# Patient Record
Sex: MALE | Race: WHITE | NOT HISPANIC OR LATINO | Employment: OTHER | URBAN - METROPOLITAN AREA
[De-identification: names, ages, dates, MRNs, and addresses within clinical notes are randomized per-mention and may not be internally consistent; named-entity substitution may affect disease eponyms.]

---

## 2021-01-15 DIAGNOSIS — Z23 NEED FOR VACCINATION: ICD-10-CM

## 2022-01-26 ENCOUNTER — APPOINTMENT (OUTPATIENT)
Dept: RADIOLOGY | Facility: MEDICAL CENTER | Age: 72
DRG: 521 | End: 2022-01-26
Attending: EMERGENCY MEDICINE
Payer: MEDICARE

## 2022-01-26 ENCOUNTER — HOSPITAL ENCOUNTER (INPATIENT)
Facility: MEDICAL CENTER | Age: 72
LOS: 5 days | DRG: 521 | End: 2022-01-31
Attending: EMERGENCY MEDICINE | Admitting: INTERNAL MEDICINE
Payer: MEDICARE

## 2022-01-26 DIAGNOSIS — I26.99 OTHER ACUTE PULMONARY EMBOLISM WITHOUT ACUTE COR PULMONALE (HCC): ICD-10-CM

## 2022-01-26 DIAGNOSIS — Z87.81 S/P LEFT HIP FRACTURE: ICD-10-CM

## 2022-01-26 PROBLEM — R56.9 SEIZURE (HCC): Status: ACTIVE | Noted: 2022-01-26

## 2022-01-26 PROBLEM — R73.9 HYPERGLYCEMIA: Status: ACTIVE | Noted: 2022-01-26

## 2022-01-26 PROBLEM — F03.90 DEMENTIA (HCC): Status: ACTIVE | Noted: 2022-01-26

## 2022-01-26 PROBLEM — D64.9 ANEMIA: Status: ACTIVE | Noted: 2022-01-26

## 2022-01-26 LAB
ALBUMIN SERPL BCP-MCNC: 3.7 G/DL (ref 3.2–4.9)
ALBUMIN/GLOB SERPL: 1.2 G/DL
ALP SERPL-CCNC: 85 U/L (ref 30–99)
ALT SERPL-CCNC: 23 U/L (ref 2–50)
ANION GAP SERPL CALC-SCNC: 13 MMOL/L (ref 7–16)
APTT PPP: 31.8 SEC (ref 24.7–36)
AST SERPL-CCNC: 23 U/L (ref 12–45)
BASOPHILS # BLD AUTO: 0.3 % (ref 0–1.8)
BASOPHILS # BLD: 0.03 K/UL (ref 0–0.12)
BILIRUB SERPL-MCNC: 0.3 MG/DL (ref 0.1–1.5)
BUN SERPL-MCNC: 25 MG/DL (ref 8–22)
CALCIUM SERPL-MCNC: 8.3 MG/DL (ref 8.5–10.5)
CHLORIDE SERPL-SCNC: 99 MMOL/L (ref 96–112)
CK SERPL-CCNC: 145 U/L (ref 0–154)
CO2 SERPL-SCNC: 20 MMOL/L (ref 20–33)
CREAT SERPL-MCNC: 1.36 MG/DL (ref 0.5–1.4)
EOSINOPHIL # BLD AUTO: 0.03 K/UL (ref 0–0.51)
EOSINOPHIL NFR BLD: 0.3 % (ref 0–6.9)
ERYTHROCYTE [DISTWIDTH] IN BLOOD BY AUTOMATED COUNT: 51.1 FL (ref 35.9–50)
EST. AVERAGE GLUCOSE BLD GHB EST-MCNC: 123 MG/DL
FERRITIN SERPL-MCNC: 222 NG/ML (ref 22–322)
FOLATE SERPL-MCNC: 7.8 NG/ML
GLOBULIN SER CALC-MCNC: 3 G/DL (ref 1.9–3.5)
GLUCOSE SERPL-MCNC: 241 MG/DL (ref 65–99)
HBA1C MFR BLD: 5.9 % (ref 4–5.6)
HCT VFR BLD AUTO: 28.8 % (ref 42–52)
HGB BLD-MCNC: 9.5 G/DL (ref 14–18)
HGB RETIC QN AUTO: 34.1 PG/CELL (ref 29–35)
IMM GRANULOCYTES # BLD AUTO: 0.06 K/UL (ref 0–0.11)
IMM GRANULOCYTES NFR BLD AUTO: 0.6 % (ref 0–0.9)
IMM RETICS NFR: 6.7 % (ref 9.3–17.4)
INR PPP: 1.13 (ref 0.87–1.13)
IRON SATN MFR SERPL: 21 % (ref 15–55)
IRON SERPL-MCNC: 52 UG/DL (ref 50–180)
LYMPHOCYTES # BLD AUTO: 0.56 K/UL (ref 1–4.8)
LYMPHOCYTES NFR BLD: 5.2 % (ref 22–41)
MAGNESIUM SERPL-MCNC: 2 MG/DL (ref 1.5–2.5)
MCH RBC QN AUTO: 31.6 PG (ref 27–33)
MCHC RBC AUTO-ENTMCNC: 33 G/DL (ref 33.7–35.3)
MCV RBC AUTO: 95.7 FL (ref 81.4–97.8)
MONOCYTES # BLD AUTO: 0.51 K/UL (ref 0–0.85)
MONOCYTES NFR BLD AUTO: 4.8 % (ref 0–13.4)
NEUTROPHILS # BLD AUTO: 9.53 K/UL (ref 1.82–7.42)
NEUTROPHILS NFR BLD: 88.8 % (ref 44–72)
NRBC # BLD AUTO: 0 K/UL
NRBC BLD-RTO: 0 /100 WBC
PHENYTOIN SERPL-MCNC: <0.8 UG/ML (ref 10–20)
PLATELET # BLD AUTO: 243 K/UL (ref 164–446)
PMV BLD AUTO: 9.9 FL (ref 9–12.9)
POTASSIUM SERPL-SCNC: 3.6 MMOL/L (ref 3.6–5.5)
PROT SERPL-MCNC: 6.7 G/DL (ref 6–8.2)
PROTHROMBIN TIME: 14.2 SEC (ref 12–14.6)
RBC # BLD AUTO: 3.01 M/UL (ref 4.7–6.1)
RETICS # AUTO: 0.05 M/UL (ref 0.04–0.06)
RETICS/RBC NFR: 1.5 % (ref 0.8–2.1)
SODIUM SERPL-SCNC: 132 MMOL/L (ref 135–145)
TIBC SERPL-MCNC: 243 UG/DL (ref 250–450)
UIBC SERPL-MCNC: 191 UG/DL (ref 110–370)
VIT B12 SERPL-MCNC: 625 PG/ML (ref 211–911)
WBC # BLD AUTO: 10.7 K/UL (ref 4.8–10.8)

## 2022-01-26 PROCEDURE — 80185 ASSAY OF PHENYTOIN TOTAL: CPT

## 2022-01-26 PROCEDURE — 72192 CT PELVIS W/O DYE: CPT

## 2022-01-26 PROCEDURE — 96375 TX/PRO/DX INJ NEW DRUG ADDON: CPT

## 2022-01-26 PROCEDURE — 700111 HCHG RX REV CODE 636 W/ 250 OVERRIDE (IP): Performed by: EMERGENCY MEDICINE

## 2022-01-26 PROCEDURE — 83036 HEMOGLOBIN GLYCOSYLATED A1C: CPT

## 2022-01-26 PROCEDURE — 85730 THROMBOPLASTIN TIME PARTIAL: CPT

## 2022-01-26 PROCEDURE — 82550 ASSAY OF CK (CPK): CPT

## 2022-01-26 PROCEDURE — 82607 VITAMIN B-12: CPT

## 2022-01-26 PROCEDURE — 85025 COMPLETE CBC W/AUTO DIFF WBC: CPT

## 2022-01-26 PROCEDURE — 80053 COMPREHEN METABOLIC PANEL: CPT

## 2022-01-26 PROCEDURE — 700102 HCHG RX REV CODE 250 W/ 637 OVERRIDE(OP): Performed by: INTERNAL MEDICINE

## 2022-01-26 PROCEDURE — 96374 THER/PROPH/DIAG INJ IV PUSH: CPT

## 2022-01-26 PROCEDURE — 700105 HCHG RX REV CODE 258: Performed by: INTERNAL MEDICINE

## 2022-01-26 PROCEDURE — 83735 ASSAY OF MAGNESIUM: CPT

## 2022-01-26 PROCEDURE — 99223 1ST HOSP IP/OBS HIGH 75: CPT | Mod: AI | Performed by: INTERNAL MEDICINE

## 2022-01-26 PROCEDURE — 82962 GLUCOSE BLOOD TEST: CPT

## 2022-01-26 PROCEDURE — 99291 CRITICAL CARE FIRST HOUR: CPT

## 2022-01-26 PROCEDURE — 96372 THER/PROPH/DIAG INJ SC/IM: CPT

## 2022-01-26 PROCEDURE — 83540 ASSAY OF IRON: CPT

## 2022-01-26 PROCEDURE — 70450 CT HEAD/BRAIN W/O DYE: CPT

## 2022-01-26 PROCEDURE — 85046 RETICYTE/HGB CONCENTRATE: CPT

## 2022-01-26 PROCEDURE — 770001 HCHG ROOM/CARE - MED/SURG/GYN PRIV*

## 2022-01-26 PROCEDURE — 82746 ASSAY OF FOLIC ACID SERUM: CPT

## 2022-01-26 PROCEDURE — 82728 ASSAY OF FERRITIN: CPT

## 2022-01-26 PROCEDURE — 83550 IRON BINDING TEST: CPT

## 2022-01-26 PROCEDURE — A9270 NON-COVERED ITEM OR SERVICE: HCPCS | Performed by: INTERNAL MEDICINE

## 2022-01-26 PROCEDURE — 99222 1ST HOSP IP/OBS MODERATE 55: CPT | Mod: 57 | Performed by: ORTHOPAEDIC SURGERY

## 2022-01-26 PROCEDURE — 700111 HCHG RX REV CODE 636 W/ 250 OVERRIDE (IP): Performed by: INTERNAL MEDICINE

## 2022-01-26 PROCEDURE — 85610 PROTHROMBIN TIME: CPT

## 2022-01-26 RX ORDER — DEXTROSE MONOHYDRATE 25 G/50ML
50 INJECTION, SOLUTION INTRAVENOUS
Status: DISCONTINUED | OUTPATIENT
Start: 2022-01-26 | End: 2022-01-27

## 2022-01-26 RX ORDER — ONDANSETRON 4 MG/1
4 TABLET, ORALLY DISINTEGRATING ORAL EVERY 4 HOURS PRN
Status: DISCONTINUED | OUTPATIENT
Start: 2022-01-26 | End: 2022-01-31 | Stop reason: HOSPADM

## 2022-01-26 RX ORDER — HYDROMORPHONE HYDROCHLORIDE 1 MG/ML
0.25 INJECTION, SOLUTION INTRAMUSCULAR; INTRAVENOUS; SUBCUTANEOUS
Status: DISCONTINUED | OUTPATIENT
Start: 2022-01-26 | End: 2022-01-28

## 2022-01-26 RX ORDER — HEPARIN SODIUM 5000 [USP'U]/ML
5000 INJECTION, SOLUTION INTRAVENOUS; SUBCUTANEOUS EVERY 8 HOURS
Status: DISCONTINUED | OUTPATIENT
Start: 2022-01-26 | End: 2022-01-28

## 2022-01-26 RX ORDER — PHENYTOIN SODIUM 100 MG/1
200 CAPSULE, EXTENDED RELEASE ORAL
COMMUNITY

## 2022-01-26 RX ORDER — LABETALOL HYDROCHLORIDE 5 MG/ML
10 INJECTION, SOLUTION INTRAVENOUS EVERY 4 HOURS PRN
Status: DISCONTINUED | OUTPATIENT
Start: 2022-01-26 | End: 2022-01-31 | Stop reason: HOSPADM

## 2022-01-26 RX ORDER — ONDANSETRON 2 MG/ML
4 INJECTION INTRAMUSCULAR; INTRAVENOUS EVERY 4 HOURS PRN
Status: DISCONTINUED | OUTPATIENT
Start: 2022-01-26 | End: 2022-01-28

## 2022-01-26 RX ORDER — OXYCODONE HYDROCHLORIDE 5 MG/1
5 TABLET ORAL
Status: DISCONTINUED | OUTPATIENT
Start: 2022-01-26 | End: 2022-01-28

## 2022-01-26 RX ORDER — PHENYTOIN SODIUM 100 MG/1
200 CAPSULE, EXTENDED RELEASE ORAL EVERY EVENING
Status: DISCONTINUED | OUTPATIENT
Start: 2022-01-26 | End: 2022-01-31 | Stop reason: HOSPADM

## 2022-01-26 RX ORDER — ACETAMINOPHEN 325 MG/1
650 TABLET ORAL EVERY 6 HOURS PRN
Status: DISCONTINUED | OUTPATIENT
Start: 2022-01-26 | End: 2022-01-28

## 2022-01-26 RX ORDER — LISINOPRIL 5 MG/1
5 TABLET ORAL EVERY MORNING
Status: ON HOLD | COMMUNITY
End: 2022-01-31

## 2022-01-26 RX ORDER — SIMVASTATIN 5 MG
5 TABLET ORAL NIGHTLY
COMMUNITY

## 2022-01-26 RX ORDER — SODIUM CHLORIDE 9 MG/ML
INJECTION, SOLUTION INTRAVENOUS CONTINUOUS
Status: ACTIVE | OUTPATIENT
Start: 2022-01-26 | End: 2022-01-27

## 2022-01-26 RX ORDER — BISACODYL 10 MG
10 SUPPOSITORY, RECTAL RECTAL
Status: DISCONTINUED | OUTPATIENT
Start: 2022-01-26 | End: 2022-01-28

## 2022-01-26 RX ORDER — OXYCODONE HYDROCHLORIDE 5 MG/1
2.5 TABLET ORAL
Status: DISCONTINUED | OUTPATIENT
Start: 2022-01-26 | End: 2022-01-28

## 2022-01-26 RX ORDER — AMOXICILLIN 250 MG
2 CAPSULE ORAL 2 TIMES DAILY
Status: DISCONTINUED | OUTPATIENT
Start: 2022-01-26 | End: 2022-01-28

## 2022-01-26 RX ORDER — POLYETHYLENE GLYCOL 3350 17 G/17G
1 POWDER, FOR SOLUTION ORAL
Status: DISCONTINUED | OUTPATIENT
Start: 2022-01-26 | End: 2022-01-28

## 2022-01-26 RX ADMIN — FENTANYL CITRATE 50 MCG: 50 INJECTION, SOLUTION INTRAMUSCULAR; INTRAVENOUS at 19:43

## 2022-01-26 RX ADMIN — SODIUM CHLORIDE: 9 INJECTION, SOLUTION INTRAVENOUS at 21:46

## 2022-01-26 RX ADMIN — PHENYTOIN SODIUM 200 MG: 100 CAPSULE ORAL at 21:46

## 2022-01-26 RX ADMIN — HEPARIN SODIUM 5000 UNITS: 5000 INJECTION, SOLUTION INTRAVENOUS; SUBCUTANEOUS at 21:48

## 2022-01-26 RX ADMIN — HYDROMORPHONE HYDROCHLORIDE 0.25 MG: 1 INJECTION, SOLUTION INTRAMUSCULAR; INTRAVENOUS; SUBCUTANEOUS at 20:51

## 2022-01-26 ASSESSMENT — ENCOUNTER SYMPTOMS
BRUISES/BLEEDS EASILY: 0
BLURRED VISION: 0
INSOMNIA: 0
PALPITATIONS: 0
NECK PAIN: 0
MYALGIAS: 0
VOMITING: 0
SORE THROAT: 0
HEADACHES: 0
HEMOPTYSIS: 0
DOUBLE VISION: 0
DEPRESSION: 0
NAUSEA: 0
WEIGHT LOSS: 0
COUGH: 0
DIZZINESS: 0
FEVER: 0
STRIDOR: 0

## 2022-01-27 ENCOUNTER — ANESTHESIA (OUTPATIENT)
Dept: SURGERY | Facility: MEDICAL CENTER | Age: 72
DRG: 521 | End: 2022-01-27
Payer: MEDICARE

## 2022-01-27 ENCOUNTER — ANESTHESIA EVENT (OUTPATIENT)
Dept: SURGERY | Facility: MEDICAL CENTER | Age: 72
DRG: 521 | End: 2022-01-27
Payer: MEDICARE

## 2022-01-27 LAB
ABO GROUP BLD: NORMAL
ANION GAP SERPL CALC-SCNC: 10 MMOL/L (ref 7–16)
BLD GP AB SCN SERPL QL: NORMAL
BUN SERPL-MCNC: 26 MG/DL (ref 8–22)
CALCIUM SERPL-MCNC: 8.5 MG/DL (ref 8.5–10.5)
CHLORIDE SERPL-SCNC: 103 MMOL/L (ref 96–112)
CO2 SERPL-SCNC: 20 MMOL/L (ref 20–33)
CREAT SERPL-MCNC: 1.18 MG/DL (ref 0.5–1.4)
EKG IMPRESSION: NORMAL
GLUCOSE BLD-MCNC: 105 MG/DL (ref 65–99)
GLUCOSE BLD-MCNC: 112 MG/DL (ref 65–99)
GLUCOSE BLD-MCNC: 244 MG/DL (ref 65–99)
GLUCOSE SERPL-MCNC: 119 MG/DL (ref 65–99)
POTASSIUM SERPL-SCNC: 4.1 MMOL/L (ref 3.6–5.5)
RH BLD: NORMAL
SARS-COV+SARS-COV-2 AG RESP QL IA.RAPID: NOTDETECTED
SODIUM SERPL-SCNC: 133 MMOL/L (ref 135–145)
SPECIMEN SOURCE: NORMAL

## 2022-01-27 PROCEDURE — 80048 BASIC METABOLIC PNL TOTAL CA: CPT

## 2022-01-27 PROCEDURE — 27236 TREAT THIGH FRACTURE: CPT | Mod: LT | Performed by: ORTHOPAEDIC SURGERY

## 2022-01-27 PROCEDURE — 770001 HCHG ROOM/CARE - MED/SURG/GYN PRIV*

## 2022-01-27 PROCEDURE — 160048 HCHG OR STATISTICAL LEVEL 1-5: Performed by: ORTHOPAEDIC SURGERY

## 2022-01-27 PROCEDURE — 0QS70ZZ REPOSITION LEFT UPPER FEMUR, OPEN APPROACH: ICD-10-PCS | Performed by: ORTHOPAEDIC SURGERY

## 2022-01-27 PROCEDURE — 27236 TREAT THIGH FRACTURE: CPT | Mod: 80ROC,LT | Performed by: STUDENT IN AN ORGANIZED HEALTH CARE EDUCATION/TRAINING PROGRAM

## 2022-01-27 PROCEDURE — 160042 HCHG SURGERY MINUTES - EA ADDL 1 MIN LEVEL 5: Performed by: ORTHOPAEDIC SURGERY

## 2022-01-27 PROCEDURE — 86900 BLOOD TYPING SEROLOGIC ABO: CPT

## 2022-01-27 PROCEDURE — 700101 HCHG RX REV CODE 250: Performed by: ANESTHESIOLOGY

## 2022-01-27 PROCEDURE — 36415 COLL VENOUS BLD VENIPUNCTURE: CPT

## 2022-01-27 PROCEDURE — L8699 PROSTHETIC IMPLANT NOS: HCPCS | Performed by: ORTHOPAEDIC SURGERY

## 2022-01-27 PROCEDURE — 99232 SBSQ HOSP IP/OBS MODERATE 35: CPT | Performed by: INTERNAL MEDICINE

## 2022-01-27 PROCEDURE — 93005 ELECTROCARDIOGRAM TRACING: CPT | Performed by: ORTHOPAEDIC SURGERY

## 2022-01-27 PROCEDURE — C1776 JOINT DEVICE (IMPLANTABLE): HCPCS | Performed by: ORTHOPAEDIC SURGERY

## 2022-01-27 PROCEDURE — 160002 HCHG RECOVERY MINUTES (STAT): Performed by: ORTHOPAEDIC SURGERY

## 2022-01-27 PROCEDURE — 93010 ELECTROCARDIOGRAM REPORT: CPT | Performed by: INTERNAL MEDICINE

## 2022-01-27 PROCEDURE — 700102 HCHG RX REV CODE 250 W/ 637 OVERRIDE(OP): Performed by: INTERNAL MEDICINE

## 2022-01-27 PROCEDURE — 0SRS0J9 REPLACEMENT OF LEFT HIP JOINT, FEMORAL SURFACE WITH SYNTHETIC SUBSTITUTE, CEMENTED, OPEN APPROACH: ICD-10-PCS | Performed by: ORTHOPAEDIC SURGERY

## 2022-01-27 PROCEDURE — A9270 NON-COVERED ITEM OR SERVICE: HCPCS | Performed by: INTERNAL MEDICINE

## 2022-01-27 PROCEDURE — 87426 SARSCOV CORONAVIRUS AG IA: CPT

## 2022-01-27 PROCEDURE — 96376 TX/PRO/DX INJ SAME DRUG ADON: CPT

## 2022-01-27 PROCEDURE — 700111 HCHG RX REV CODE 636 W/ 250 OVERRIDE (IP): Performed by: INTERNAL MEDICINE

## 2022-01-27 PROCEDURE — 86901 BLOOD TYPING SEROLOGIC RH(D): CPT

## 2022-01-27 PROCEDURE — 700105 HCHG RX REV CODE 258: Performed by: ANESTHESIOLOGY

## 2022-01-27 PROCEDURE — 502000 HCHG MISC OR IMPLANTS RC 0278: Performed by: ORTHOPAEDIC SURGERY

## 2022-01-27 PROCEDURE — 160031 HCHG SURGERY MINUTES - 1ST 30 MINS LEVEL 5: Performed by: ORTHOPAEDIC SURGERY

## 2022-01-27 PROCEDURE — 160009 HCHG ANES TIME/MIN: Performed by: ORTHOPAEDIC SURGERY

## 2022-01-27 PROCEDURE — 160035 HCHG PACU - 1ST 60 MINS PHASE I: Performed by: ORTHOPAEDIC SURGERY

## 2022-01-27 PROCEDURE — 86850 RBC ANTIBODY SCREEN: CPT

## 2022-01-27 PROCEDURE — 501838 HCHG SUTURE GENERAL: Performed by: ORTHOPAEDIC SURGERY

## 2022-01-27 PROCEDURE — 82962 GLUCOSE BLOOD TEST: CPT

## 2022-01-27 PROCEDURE — 700111 HCHG RX REV CODE 636 W/ 250 OVERRIDE (IP): Performed by: ANESTHESIOLOGY

## 2022-01-27 DEVICE — BONE CEMENT SIMPLEX ANTIBIO - (10/PK): Type: IMPLANTABLE DEVICE | Site: HIP | Status: FUNCTIONAL

## 2022-01-27 DEVICE — IMPLANTABLE DEVICE: Type: IMPLANTABLE DEVICE | Site: HIP | Status: FUNCTIONAL

## 2022-01-27 RX ORDER — SODIUM CHLORIDE, SODIUM LACTATE, POTASSIUM CHLORIDE, CALCIUM CHLORIDE 600; 310; 30; 20 MG/100ML; MG/100ML; MG/100ML; MG/100ML
INJECTION, SOLUTION INTRAVENOUS
Status: DISCONTINUED | OUTPATIENT
Start: 2022-01-27 | End: 2022-01-27 | Stop reason: SURG

## 2022-01-27 RX ORDER — ROCURONIUM BROMIDE 10 MG/ML
INJECTION, SOLUTION INTRAVENOUS PRN
Status: DISCONTINUED | OUTPATIENT
Start: 2022-01-27 | End: 2022-01-27 | Stop reason: SURG

## 2022-01-27 RX ORDER — DEXTROSE MONOHYDRATE 25 G/50ML
50 INJECTION, SOLUTION INTRAVENOUS
Status: DISCONTINUED | OUTPATIENT
Start: 2022-01-27 | End: 2022-01-31

## 2022-01-27 RX ORDER — CEFAZOLIN SODIUM 1 G/3ML
INJECTION, POWDER, FOR SOLUTION INTRAMUSCULAR; INTRAVENOUS PRN
Status: DISCONTINUED | OUTPATIENT
Start: 2022-01-27 | End: 2022-01-27 | Stop reason: SURG

## 2022-01-27 RX ORDER — MEPERIDINE HYDROCHLORIDE 25 MG/ML
12.5 INJECTION INTRAMUSCULAR; INTRAVENOUS; SUBCUTANEOUS
Status: DISCONTINUED | OUTPATIENT
Start: 2022-01-27 | End: 2022-01-27 | Stop reason: HOSPADM

## 2022-01-27 RX ORDER — HYDROMORPHONE HYDROCHLORIDE 1 MG/ML
0.2 INJECTION, SOLUTION INTRAMUSCULAR; INTRAVENOUS; SUBCUTANEOUS
Status: DISCONTINUED | OUTPATIENT
Start: 2022-01-27 | End: 2022-01-27 | Stop reason: HOSPADM

## 2022-01-27 RX ORDER — HYDROMORPHONE HYDROCHLORIDE 1 MG/ML
0.4 INJECTION, SOLUTION INTRAMUSCULAR; INTRAVENOUS; SUBCUTANEOUS
Status: DISCONTINUED | OUTPATIENT
Start: 2022-01-27 | End: 2022-01-27 | Stop reason: HOSPADM

## 2022-01-27 RX ORDER — HYDROMORPHONE HYDROCHLORIDE 1 MG/ML
0.6 INJECTION, SOLUTION INTRAMUSCULAR; INTRAVENOUS; SUBCUTANEOUS
Status: DISCONTINUED | OUTPATIENT
Start: 2022-01-27 | End: 2022-01-27 | Stop reason: HOSPADM

## 2022-01-27 RX ORDER — ONDANSETRON 2 MG/ML
INJECTION INTRAMUSCULAR; INTRAVENOUS PRN
Status: DISCONTINUED | OUTPATIENT
Start: 2022-01-27 | End: 2022-01-27 | Stop reason: SURG

## 2022-01-27 RX ORDER — ONDANSETRON 2 MG/ML
4 INJECTION INTRAMUSCULAR; INTRAVENOUS
Status: DISCONTINUED | OUTPATIENT
Start: 2022-01-27 | End: 2022-01-27 | Stop reason: HOSPADM

## 2022-01-27 RX ORDER — DIPHENHYDRAMINE HYDROCHLORIDE 50 MG/ML
12.5 INJECTION INTRAMUSCULAR; INTRAVENOUS
Status: DISCONTINUED | OUTPATIENT
Start: 2022-01-27 | End: 2022-01-27 | Stop reason: HOSPADM

## 2022-01-27 RX ORDER — SODIUM CHLORIDE, SODIUM LACTATE, POTASSIUM CHLORIDE, CALCIUM CHLORIDE 600; 310; 30; 20 MG/100ML; MG/100ML; MG/100ML; MG/100ML
INJECTION, SOLUTION INTRAVENOUS CONTINUOUS
Status: DISCONTINUED | OUTPATIENT
Start: 2022-01-27 | End: 2022-01-27 | Stop reason: HOSPADM

## 2022-01-27 RX ORDER — PHENYLEPHRINE HCL IN 0.9% NACL 0.5 MG/5ML
SYRINGE (ML) INTRAVENOUS PRN
Status: DISCONTINUED | OUTPATIENT
Start: 2022-01-27 | End: 2022-01-27 | Stop reason: SURG

## 2022-01-27 RX ORDER — HALOPERIDOL 5 MG/ML
1 INJECTION INTRAMUSCULAR
Status: DISCONTINUED | OUTPATIENT
Start: 2022-01-27 | End: 2022-01-27 | Stop reason: HOSPADM

## 2022-01-27 RX ORDER — OXYCODONE HCL 5 MG/5 ML
10 SOLUTION, ORAL ORAL
Status: DISCONTINUED | OUTPATIENT
Start: 2022-01-27 | End: 2022-01-27 | Stop reason: HOSPADM

## 2022-01-27 RX ORDER — DEXAMETHASONE SODIUM PHOSPHATE 4 MG/ML
INJECTION, SOLUTION INTRA-ARTICULAR; INTRALESIONAL; INTRAMUSCULAR; INTRAVENOUS; SOFT TISSUE PRN
Status: DISCONTINUED | OUTPATIENT
Start: 2022-01-27 | End: 2022-01-27 | Stop reason: SURG

## 2022-01-27 RX ORDER — OXYCODONE HCL 5 MG/5 ML
5 SOLUTION, ORAL ORAL
Status: DISCONTINUED | OUTPATIENT
Start: 2022-01-27 | End: 2022-01-27 | Stop reason: HOSPADM

## 2022-01-27 RX ADMIN — HEPARIN SODIUM 5000 UNITS: 5000 INJECTION, SOLUTION INTRAVENOUS; SUBCUTANEOUS at 21:23

## 2022-01-27 RX ADMIN — PROPOFOL 150 MG: 10 INJECTION, EMULSION INTRAVENOUS at 17:01

## 2022-01-27 RX ADMIN — FENTANYL CITRATE 100 MCG: 50 INJECTION, SOLUTION INTRAMUSCULAR; INTRAVENOUS at 16:58

## 2022-01-27 RX ADMIN — FENTANYL CITRATE 50 MCG: 50 INJECTION, SOLUTION INTRAMUSCULAR; INTRAVENOUS at 17:29

## 2022-01-27 RX ADMIN — DEXAMETHASONE SODIUM PHOSPHATE 4 MG: 4 INJECTION, SOLUTION INTRA-ARTICULAR; INTRALESIONAL; INTRAMUSCULAR; INTRAVENOUS; SOFT TISSUE at 17:57

## 2022-01-27 RX ADMIN — ONDANSETRON 4 MG: 2 INJECTION INTRAMUSCULAR; INTRAVENOUS at 17:57

## 2022-01-27 RX ADMIN — CEFAZOLIN 2 G: 330 INJECTION, POWDER, FOR SOLUTION INTRAMUSCULAR; INTRAVENOUS at 17:04

## 2022-01-27 RX ADMIN — ROCURONIUM BROMIDE 40 MG: 10 INJECTION, SOLUTION INTRAVENOUS at 17:01

## 2022-01-27 RX ADMIN — SODIUM CHLORIDE, POTASSIUM CHLORIDE, SODIUM LACTATE AND CALCIUM CHLORIDE: 600; 310; 30; 20 INJECTION, SOLUTION INTRAVENOUS at 17:10

## 2022-01-27 RX ADMIN — Medication 100 MCG: at 17:21

## 2022-01-27 RX ADMIN — Medication 100 MCG: at 17:09

## 2022-01-27 RX ADMIN — PHENYTOIN SODIUM 200 MG: 100 CAPSULE ORAL at 21:22

## 2022-01-27 RX ADMIN — FENTANYL CITRATE 50 MCG: 50 INJECTION, SOLUTION INTRAMUSCULAR; INTRAVENOUS at 17:47

## 2022-01-27 RX ADMIN — FENTANYL CITRATE 50 MCG: 50 INJECTION, SOLUTION INTRAMUSCULAR; INTRAVENOUS at 17:37

## 2022-01-27 RX ADMIN — HYDROMORPHONE HYDROCHLORIDE 0.25 MG: 1 INJECTION, SOLUTION INTRAMUSCULAR; INTRAVENOUS; SUBCUTANEOUS at 12:22

## 2022-01-27 ASSESSMENT — PAIN DESCRIPTION - PAIN TYPE
TYPE: SURGICAL PAIN
TYPE: ACUTE PAIN
TYPE: ACUTE PAIN

## 2022-01-27 ASSESSMENT — LIFESTYLE VARIABLES
AVERAGE NUMBER OF DAYS PER WEEK YOU HAVE A DRINK CONTAINING ALCOHOL: 0
TOTAL SCORE: 0
HAVE YOU EVER FELT YOU SHOULD CUT DOWN ON YOUR DRINKING: NO
TOTAL SCORE: 0
DOES PATIENT WANT TO STOP DRINKING: NO
ON A TYPICAL DAY WHEN YOU DRINK ALCOHOL HOW MANY DRINKS DO YOU HAVE: 0
HOW MANY TIMES IN THE PAST YEAR HAVE YOU HAD 5 OR MORE DRINKS IN A DAY: 0
EVER FELT BAD OR GUILTY ABOUT YOUR DRINKING: NO
CONSUMPTION TOTAL: NEGATIVE
HAVE PEOPLE ANNOYED YOU BY CRITICIZING YOUR DRINKING: NO
TOTAL SCORE: 0
EVER HAD A DRINK FIRST THING IN THE MORNING TO STEADY YOUR NERVES TO GET RID OF A HANGOVER: NO
ALCOHOL_USE: NO

## 2022-01-27 ASSESSMENT — ENCOUNTER SYMPTOMS
BRUISES/BLEEDS EASILY: 0
MYALGIAS: 0
COUGH: 0
DIZZINESS: 0
PALPITATIONS: 0
SHORTNESS OF BREATH: 0
DOUBLE VISION: 0
VOMITING: 0
WEIGHT LOSS: 0
SORE THROAT: 0
FEVER: 0
INSOMNIA: 0
HEADACHES: 0
STRIDOR: 0
NAUSEA: 0
DEPRESSION: 0
NECK PAIN: 0
BLURRED VISION: 0

## 2022-01-27 ASSESSMENT — COGNITIVE AND FUNCTIONAL STATUS - GENERAL
CLIMB 3 TO 5 STEPS WITH RAILING: A LOT
WALKING IN HOSPITAL ROOM: A LOT
MOBILITY SCORE: 14
HELP NEEDED FOR BATHING: A LITTLE
DRESSING REGULAR LOWER BODY CLOTHING: A LITTLE
TOILETING: A LITTLE
SUGGESTED CMS G CODE MODIFIER DAILY ACTIVITY: CJ
MOVING TO AND FROM BED TO CHAIR: A LITTLE
STANDING UP FROM CHAIR USING ARMS: A LOT
MOVING FROM LYING ON BACK TO SITTING ON SIDE OF FLAT BED: A LOT
SUGGESTED CMS G CODE MODIFIER MOBILITY: CL
DAILY ACTIVITIY SCORE: 21
TURNING FROM BACK TO SIDE WHILE IN FLAT BAD: A LITTLE

## 2022-01-27 ASSESSMENT — PATIENT HEALTH QUESTIONNAIRE - PHQ9
2. FEELING DOWN, DEPRESSED, IRRITABLE, OR HOPELESS: NOT AT ALL
SUM OF ALL RESPONSES TO PHQ9 QUESTIONS 1 AND 2: 0
1. LITTLE INTEREST OR PLEASURE IN DOING THINGS: NOT AT ALL

## 2022-01-27 ASSESSMENT — PAIN SCALES - GENERAL: PAIN_LEVEL: 1

## 2022-01-27 NOTE — ED NOTES
When patient is sleeping oxygen drops. Placed on 5-8 liter mask when sleeping. When patient is awake oxygenation is %.

## 2022-01-27 NOTE — ED NOTES
"Rounded on PT. PT removed IV from arm and all monitors. PT states, \" I didn't know I needed those now let me sleep.\" PT reoriented to place and situation of hip fracture and awaiting surgery.   "

## 2022-01-27 NOTE — ASSESSMENT & PLAN NOTE
Subsequent to seizure.  S/p Open treatment of left femoral fracture, proximal end, neck with prosthetic replacement 1/27/2022  Pain management   Needs placement

## 2022-01-27 NOTE — PROGRESS NOTES
Neurology note    Breakthrough seizure resulting in orthopedic injury requiring surgical intervention.  Reported compliance however dilantin level was essentially undetectable.  Prior levels ~6 ug/ml.  Suspect medication non-compliance.  Advise no change in medications, continue phenytoin ER 200mg qhs.      Alon Wilks MD  Neurology

## 2022-01-27 NOTE — ASSESSMENT & PLAN NOTE
Patient lives in a group and as per him he has taken all his medication they give him.  neurology was consulted   Dilantin level are almost undetectable. Per neurology cont with dilantin   No changes on his seizure medication

## 2022-01-27 NOTE — DISCHARGE PLANNING
TBI Fall  Pt is Zeyad Horan  1950    Pt Lives at [facility]   [address] [address]  House Phone [phone]    Pt is established with the [facility] Public Guardians Office   Ritu Aparicio [phone]  Evening On Call number is [phone]    SW contacted on call Public Guardian and spoke to Maye.   WINTER notified her Pt would be admitted for a hip fracture.     Public Guardian is responsible for making health care decisions and request to be called with a plan of care for admission.     WINTER will notify ERP and RN.

## 2022-01-27 NOTE — ED NOTES
PT inconstant of urine and bowel. PT cleaned and turned. PT complaining of pain.     No Repair - Repaired With Adjacent Surgical Defect Text (Leave Blank If You Do Not Want): After obtaining clear surgical margins the defect was repaired concurrently with another surgical defect which was in close approximation.

## 2022-01-27 NOTE — ED TRIAGE NOTES
"Zeyad Horan 71 y.o. male bib EMS for     Chief Complaint   Patient presents with   • T-5000 FALL   • Seizure     x 2, witnessed, 5 mintues each   • Hip Injury     L leg outward rotation and shortening     [facility] here.  Per EMS, pt had witnessed seizure x 2 at group home lasting approx 5 minutes each.  Pt takes dilantin and per report is taking it as prescribed.  Pt had post ictal period between.  EMS reports baseline dementia and is back to baseline LOC at this time.  EMS unable to establish PIV but 2mg IM versed given PTA.  Pt a/o x 3 on arrival, small wound to posterior L head.  To CT with RN.  Report to Susie PATTON.  /73   Pulse (!) 109   Resp 20   Ht 1.803 m (5' 11\")   Wt 68 kg (150 lb)   SpO2 96%   BMI 20.92 kg/m²     "

## 2022-01-27 NOTE — ED NOTES
Pt comfort check. Denies needs. Pt confused to placed. Reoriented pt and updated on poc. Call light in hand. Will cont to monitor

## 2022-01-27 NOTE — H&P
Hospital Medicine History & Physical Note    Date of Service  1/26/2022    Primary Care Physician  Lizandro Fontanez M.D.    Consultants  orthopedics    Specialist Names: Dr. Kirkland    Code Status  Full Code    Chief Complaint  Chief Complaint   Patient presents with   • T-5000 FALL   • Seizure     x 2, witnessed, 5 mintues each   • Hip Injury     L leg outward rotation and shortening       History of Presenting Illness  Zeyad Horan is a 71 y.o. male with history of dementia who is a poor historian and [facility] here.  Per EMS, pt had witnessed seizure x 2 at group home lasting approx 5 minutes each.  Pt takes dilantin and per report is taking it as prescribed.  Pt had post ictal period between.  Patient found to have left-sided hip fracture orthopedic surgery is consulted, he is referred to hospitalist for admission.  At bedside he complains of left lower extremity pain he is otherwise a poor historian.    I discussed the plan of care with patient.    Review of Systems  Review of Systems   Constitutional: Negative for fever, malaise/fatigue and weight loss.   HENT: Negative for sore throat and tinnitus.    Eyes: Negative for blurred vision and double vision.   Respiratory: Negative for cough, hemoptysis and stridor.    Cardiovascular: Negative for chest pain and palpitations.   Gastrointestinal: Negative for nausea and vomiting.   Genitourinary: Negative for dysuria and urgency.   Musculoskeletal: Negative for myalgias and neck pain.   Skin: Negative for itching and rash.   Neurological: Negative for dizziness and headaches.   Endo/Heme/Allergies: Does not bruise/bleed easily.   Psychiatric/Behavioral: Negative for depression. The patient does not have insomnia.        Past Medical History   has a past medical history of Epilepsy ([facility]).    Surgical History   has no past surgical history on file.     Family History  family history is not on file.   Family history  reviewed with patient. There is no family history that is pertinent to the chief complaint.     Social History   reports that he has been smoking. He has a 40.00 pack-year smoking history. He has never used smokeless tobacco. He reports current alcohol use. He reports that he does not use drugs.    Allergies  Allergies   Allergen Reactions   • Nkda [No Known Drug Allergy]        Medications  Prior to Admission Medications   Prescriptions Last Dose Informant Patient Reported? Taking?   lisinopril (PRINIVIL) 5 MG Tab 1/26/2022 at 0800  Yes Yes   Sig: Take 5 mg by mouth every morning.   phenytoin ER (DILANTIN) 100 MG Cap 1/25/2022 at 2000  Yes Yes   Sig: Take 200 mg by mouth at bedtime.   simvastatin (ZOCOR) 5 MG Tab 1/25/2022 at 2000  Yes Yes   Sig: Take 5 mg by mouth every evening.      Facility-Administered Medications: None       Physical Exam  Temp:  [36.6 °C (97.8 °F)] 36.6 °C (97.8 °F)  Pulse:  [109] 109  Resp:  [20] 20  BP: (134)/(73) 134/73  SpO2:  [96 %] 96 %  Blood Pressure : 134/73   Temperature: 36.6 °C (97.8 °F)   Pulse: (!) 109   Respiration: 20   Pulse Oximetry: 96 %       Physical Exam  Vitals and nursing note reviewed.   Constitutional:       General: He is in acute distress.      Appearance: Normal appearance. He is normal weight. He is not toxic-appearing.   HENT:      Head: Normocephalic and atraumatic.      Nose: Nose normal. No congestion or rhinorrhea.      Mouth/Throat:      Mouth: Mucous membranes are moist.      Pharynx: Oropharynx is clear.   Eyes:      Extraocular Movements: Extraocular movements intact.      Conjunctiva/sclera: Conjunctivae normal.      Pupils: Pupils are equal, round, and reactive to light.   Neck:      Vascular: No carotid bruit.   Cardiovascular:      Rate and Rhythm: Normal rate and regular rhythm.      Pulses: Normal pulses.      Heart sounds: Normal heart sounds. No murmur heard.  No gallop.    Pulmonary:      Effort: No respiratory distress.      Breath sounds:  Normal breath sounds. No wheezing or rales.   Abdominal:      General: Abdomen is flat. Bowel sounds are normal. There is no distension.      Palpations: Abdomen is soft. There is no mass.      Tenderness: There is no abdominal tenderness.      Hernia: No hernia is present.   Musculoskeletal:         General: Swelling and tenderness present. No signs of injury.      Cervical back: Normal range of motion and neck supple. No muscular tenderness.      Comments: Left lower extremity range of motion limited by pain   Lymphadenopathy:      Cervical: No cervical adenopathy.   Skin:     Capillary Refill: Capillary refill takes less than 2 seconds.      Coloration: Skin is not jaundiced or pale.      Findings: No bruising.   Neurological:      General: No focal deficit present.      Mental Status: He is alert. Mental status is at baseline.      Cranial Nerves: No cranial nerve deficit.      Motor: No weakness.      Coordination: Coordination normal.   Psychiatric:         Mood and Affect: Mood normal.         Thought Content: Thought content normal.         Judgment: Judgment normal.         Laboratory:  Recent Labs     01/26/22 1938   WBC 10.7   RBC 3.01*   HEMOGLOBIN 9.5*   HEMATOCRIT 28.8*   MCV 95.7   MCH 31.6   MCHC 33.0*   RDW 51.1*   PLATELETCT 243   MPV 9.9     Recent Labs     01/26/22 1938   SODIUM 132*   POTASSIUM 3.6   CHLORIDE 99   CO2 20   GLUCOSE 241*   BUN 25*   CREATININE 1.36   CALCIUM 8.3*     Recent Labs     01/26/22 1938   ALTSGPT 23   ASTSGOT 23   ALKPHOSPHAT 85   TBILIRUBIN 0.3   GLUCOSE 241*     Recent Labs     01/26/22 1938   APTT 31.8   INR 1.13     No results for input(s): NTPROBNP in the last 72 hours.      No results for input(s): TROPONINT in the last 72 hours.    Imaging:  CT-PELVIS W/O PLUS RECONS   Final Result      1.  Severely comminuted, displaced and angulated fracture of the LEFT femoral neck.   2.  No pelvic fracture.   3.  Diffuse osteopenia.   4.  Markedly distended bladder.       CT-HEAD W/O   Final Result      1.  Diffuse atrophy and white matter changes.   2.  No acute intracranial hemorrhage or territorial infarct.   3.  Frontal encephalomalacia again seen.   4.  Chronic LEFT occipital infarct.               Assessment/Plan:  I anticipate this patient will require at least two midnights for appropriate medical management, necessitating inpatient admission.    * S/p left hip fracture  Assessment & Plan  Subsequent to seizure, no immediately reversible cardio pulmonary risk factors present justifying delay in surgery.  Symptomatic management, follow-up orthopedic surgery consult    Hyperglycemia  Assessment & Plan  Follow-up A1c, regular insulin sliding scale before every meal as needed    Anemia  Assessment & Plan  Uncertain cause, follow-up anemia labs    Seizure (HCC)  Assessment & Plan  Resume outpatient Dilantin, consider Keppra    Dementia (HCC)  Assessment & Plan  In no acute distress, supportive care        VTE prophylaxis: SCDs/TEDs

## 2022-01-27 NOTE — PROGRESS NOTES
[facility] Medicine Daily Progress Note    Date of Service  1/27/2022    Chief Complaint  Zeyad Horan is a 71 y.o. male admitted 1/26/2022 with seizure, fall and hip fracture     Hospital Course  This is a 72 y/o M with PMHX of dementia, seizures, who was admitted on 1/26/22 after presenting to ER complaining of left hip after a fall. Patient had witnessed seizure episode at group home where he lives and fell down. He was having hip pain after it.  Here in ER he was found to have  Left hip fracture, ortho consulted and patient admitted for further treatment.    Interval Problem Update  Patient seen and examined, afebrile, resting in bed, no nausea or vomiting, complains of left hip pain. Ortho consulted and plan for surgical intervention today.  I have also discussed case with neurology regarding his seizure episode and if his seizure medication needs to change, but his dilantin levels are undetectable so unsure if he was getting his dilantin and as per neurology can continue on dilantin     I have personally seen and examined the patient at bedside. I discussed the plan of care with patient and bedside RN.    Consultants/Specialty  neurology and orthopedics    Code Status  Full Code    Disposition  Patient is not medically cleared for discharge.   Anticipate discharge to  TBD .  I have placed the appropriate orders for post-discharge needs.    Review of Systems  Review of Systems   Constitutional: Negative for fever, malaise/fatigue and weight loss.   HENT: Negative for sore throat and tinnitus.    Eyes: Negative for blurred vision and double vision.   Respiratory: Negative for cough, shortness of breath and stridor.    Cardiovascular: Negative for chest pain and palpitations.   Gastrointestinal: Negative for nausea and vomiting.   Genitourinary: Negative for dysuria and urgency.   Musculoskeletal: Positive for joint pain. Negative for myalgias and neck pain.   Skin: Negative for itching and rash.   Neurological:  Negative for dizziness and headaches.   Endo/Heme/Allergies: Does not bruise/bleed easily.   Psychiatric/Behavioral: Negative for depression. The patient does not have insomnia.    All other systems reviewed and are negative.       Physical Exam  Temp:  [36.6 °C (97.8 °F)] 36.6 °C (97.8 °F)  Pulse:  [] 92  Resp:  [16-20] 18  BP: (114-142)/(57-87) 123/83  SpO2:  [91 %-100 %] 99 %    Physical Exam  Vitals and nursing note reviewed.   Constitutional:       General: He is in acute distress.      Appearance: Normal appearance. He is normal weight. He is not toxic-appearing.   HENT:      Head: Normocephalic and atraumatic.      Nose: Nose normal. No congestion or rhinorrhea.      Mouth/Throat:      Mouth: Mucous membranes are moist.      Pharynx: Oropharynx is clear.   Eyes:      General: No scleral icterus.     Conjunctiva/sclera: Conjunctivae normal.   Neck:      Vascular: No carotid bruit.   Cardiovascular:      Rate and Rhythm: Normal rate and regular rhythm.      Pulses: Normal pulses.      Heart sounds: Normal heart sounds. No murmur heard.  No gallop.    Pulmonary:      Effort: No respiratory distress.      Breath sounds: Normal breath sounds. No wheezing or rales.   Abdominal:      General: Abdomen is flat. Bowel sounds are normal. There is no distension.      Palpations: Abdomen is soft.      Tenderness: There is no abdominal tenderness. There is no guarding.   Musculoskeletal:         General: Swelling and tenderness present. No signs of injury.      Cervical back: Normal range of motion and neck supple. No muscular tenderness.      Comments: Left lower extremity range of motion limited by pain   Lymphadenopathy:      Cervical: No cervical adenopathy.   Skin:     Capillary Refill: Capillary refill takes less than 2 seconds.      Coloration: Skin is not jaundiced or pale.      Findings: No bruising.   Neurological:      General: No focal deficit present.      Mental Status: He is alert. Mental status is at  baseline.      Cranial Nerves: No cranial nerve deficit.      Motor: No weakness.      Coordination: Coordination normal.   Psychiatric:         Mood and Affect: Mood normal.         Thought Content: Thought content normal.         Judgment: Judgment normal.         Fluids  No intake or output data in the 24 hours ending 01/27/22 1205    Laboratory  Recent Labs     01/26/22 1938   WBC 10.7   RBC 3.01*   HEMOGLOBIN 9.5*   HEMATOCRIT 28.8*   MCV 95.7   MCH 31.6   MCHC 33.0*   RDW 51.1*   PLATELETCT 243   MPV 9.9     Recent Labs     01/26/22 1938 01/27/22  0334   SODIUM 132* 133*   POTASSIUM 3.6 4.1   CHLORIDE 99 103   CO2 20 20   GLUCOSE 241* 119*   BUN 25* 26*   CREATININE 1.36 1.18   CALCIUM 8.3* 8.5     Recent Labs     01/26/22 1938   APTT 31.8   INR 1.13               Imaging  CT-PELVIS W/O PLUS RECONS   Final Result      1.  Severely comminuted, displaced and angulated fracture of the LEFT femoral neck.   2.  No pelvic fracture.   3.  Diffuse osteopenia.   4.  Markedly distended bladder.      CT-HEAD W/O   Final Result      1.  Diffuse atrophy and white matter changes.   2.  No acute intracranial hemorrhage or territorial infarct.   3.  Frontal encephalomalacia again seen.   4.  Chronic LEFT occipital infarct.              Assessment/Plan  * S/p left hip fracture  Assessment & Plan  Subsequent to seizure.  Ortho consulted and plan for surgical repair today     Seizure (HCC)  Assessment & Plan  Patient lives in a group and as per him he has taken all his medication they give him.  I have consulted neurology today.  Dilantin level are almost undetectable. Per neurology cont with dilantin   No changes on his seizure medication        Hyperglycemia  Assessment & Plan   regular insulin sliding scale before every meal as needed  A1c 5.9     Anemia  Assessment & Plan  Uncertain cause, check iron panel  Monitor , transfuse if below 7       Dementia (HCC)  Assessment & Plan  In no acute distress, supportive care        VTE prophylaxis: heparin ppx    I have performed a physical exam and reviewed and updated ROS and Plan today (1/27/2022). In review of yesterday's note (1/26/2022), there are no changes except as documented above.

## 2022-01-27 NOTE — ASSESSMENT & PLAN NOTE
Uncertain cause, check iron panel  Monitor , transfuse if below 7   1/28- stable.  Anemia of chronic disease.  No active bleeding.

## 2022-01-27 NOTE — ED PROVIDER NOTES
"ED Provider Note    CHIEF COMPLAINT  Chief Complaint   Patient presents with   • T-5000 FALL   • Seizure     x 2, witnessed, 5 mintues each   • Hip Injury     L leg outward rotation and shortening       HPI  Zeyad Horan is a 71 y.o. male who presents to the emergency department chief complaint of a seizure and left leg pain.  The patient lives in a care facility where he has a history of seizure disorder as well as some baseline dementia.  Patient states he is having left hip pain but he denies any headache or neck pain he is alert to himself but not year he knows that he is in the hospital.  Denies any chest pain or shortness of breath weakness numbness or tingling.  Witnessed to 5-minute seizures he has been compliant with his medications because he lives in a facility supposed to be on Dilantin.    REVIEW OF SYSTEMS  Positives as above. Pertinent negatives include nausea vomiting chest pain shortness of breath fevers headache neck pain otherwise limited secondary to chronic dementia  All other review of systems are negative    PAST MEDICAL HISTORY       SOCIAL HISTORY  Social History     Tobacco Use   • Smoking status: Current Every Day Smoker     Packs/day: 1.00     Years: 40.00     Pack years: 40.00   • Smokeless tobacco: Never Used   • Tobacco comment: rolls his own nicatine stains on figer tips.   Substance and Sexual Activity   • Alcohol use: Yes   • Drug use: No   • Sexual activity: Not on file       SURGICAL HISTORY  patient denies any surgical history    CURRENT MEDICATIONS  Home Medications    **Home medications have not yet been reviewed for this encounter**         ALLERGIES  Allergies   Allergen Reactions   • Nkda [No Known Drug Allergy]        PHYSICAL EXAM  VITAL SIGNS: /73   Pulse (!) 109   Resp 20   Ht 1.803 m (5' 11\")   Wt 68 kg (150 lb)   SpO2 96%   BMI 20.92 kg/m²    Pulse ox interpretation: I interpret this pulse ox as normal.  Constitutional: Alert in no apparent " distress.  HENT: Left frontal scalp hematoma with abrasion no acute laceration no midline neck tenderness, MMM  Eyes: PER, Conjunctiva normal, Non-icteric.   Cardiovascular: Regular rate and rhythm, no murmurs.  DP pulses 2+ bilaterally  Thorax & Lungs: Normal breath sounds, No respiratory distress, No wheezing, No chest tenderness.   Abdomen: Bowel sounds normal, Soft, No tenderness, No pulsatile masses. No peritoneal signs.  Skin: Warm, Dry, No erythema, No rash.   Back: No bony tenderness, No CVA tenderness.   Extremities/MSK: Intact equal distal pulses, No edema, N tenderness of the left hip the left leg is shortened and internally rotated compared to the right, No cyanosis, no major deformities noted  Neurologic: Alert and oriented to person and place but not year, No focal deficits noted.       DIFFERENTIAL DIAGNOSIS AND WORK UP PLAN    This is a 71 y.o. male who presents as a code TBI was taken emergently to the CT scanner due to head trauma and his advanced age he is back to his baseline neurologically and has a baseline of seizures so that is not the concern at this time however he also has left hip pain with a shortened internally rotated leg and I am concerned for left hip fracture.  Once we get IV access he will be treated with pain management he was given IM Versed by EMS prior to arrival for the seizures.    DIAGNOSTIC STUDIES / PROCEDURES    EKG  No results found for this or any previous visit.    LABS  Pertinent Lab Findings    Labs Reviewed   CBC WITH DIFFERENTIAL - Abnormal; Notable for the following components:       Result Value    RBC 3.01 (*)     Hemoglobin 9.5 (*)     Hematocrit 28.8 (*)     MCHC 33.0 (*)     RDW 51.1 (*)     Neutrophils-Polys 88.80 (*)     Lymphocytes 5.20 (*)     Neutrophils (Absolute) 9.53 (*)     Lymphs (Absolute) 0.56 (*)     All other components within normal limits   COMP METABOLIC PANEL - Abnormal; Notable for the following components:    Sodium 132 (*)     Glucose 241  "(*)     Bun 25 (*)     Calcium 8.3 (*)     All other components within normal limits   ESTIMATED GFR - Abnormal; Notable for the following components:    GFR If Non  51 (*)     All other components within normal limits   PROTHROMBIN TIME    Narrative:     Indicate which anticoagulants the patient is on:->UNKNOWN   APTT    Narrative:     Indicate which anticoagulants the patient is on:->UNKNOWN   DILANTIN    Narrative:     Indicate which anticoagulants the patient is on:->UNKNOWN   MAGNESIUM   CREATINE KINASE       RADIOLOGY  CT-PELVIS W/O PLUS RECONS   Final Result      1.  Severely comminuted, displaced and angulated fracture of the LEFT femoral neck.   2.  No pelvic fracture.   3.  Diffuse osteopenia.   4.  Markedly distended bladder.      CT-HEAD W/O   Final Result      1.  Diffuse atrophy and white matter changes.   2.  No acute intracranial hemorrhage or territorial infarct.   3.  Frontal encephalomalacia again seen.   4.  Chronic LEFT occipital infarct.           The radiologist's interpretation of all radiological studies have been reviewed by me.      COURSE & MEDICAL DECISION MAKING  Pertinent Labs & Imaging studies reviewed. (See chart for details)    7:18 PM  Spoke w Dr Kirkland with orthopedic surgery who has accepted the patient and will see him in the morning for potential repair of his hip tomorrow    7:24 PM  Spoke w Dr Tan with the hospitalist service and he has accepted the patient for hospitalization.    I spoke with our  the patient is a [place] state and assigned decision-maker needs to be contact tomorrow prior to the surgery but they were alerted the patient will be hospitalized evening.    Patient is still having pain in left hip after 50 of fentanyl been further pain management by the hospitalist service.  Still pending his Dilantin level    /73   Pulse (!) 109   Temp 36.6 °C (97.8 °F) (Temporal)   Resp 20   Ht 1.803 m (5' 11\")   Wt 68 kg (150 " lb)   SpO2 96%   BMI 20.92 kg/m²       I verified that the patient was wearing a mask and I was wearing appropriate PPE every time I entered the room. The patient's mask was on the patient at all times during my encounter except for a brief view of the oropharynx.          FINAL IMPRESSION  1. Seizure disorder  2. L femoral neck fracture          Electronically signed by: Kala Kelly M.D., 1/26/2022 6:54 PM    This dictation has been created using voice recognition software and/or scribes. The accuracy of the dictation is limited by the abilities of the software and the expertise of the scribes. I expect there may be some errors of grammar and possibly content. I made every attempt to manually correct the errors within my dictation. However, errors related to voice recognition software and/or scribes may still exist and should be interpreted within the appropriate context.

## 2022-01-27 NOTE — CONSULTS
1/27/2022    Time Called: 19:45  Time Arrived: 19:50    The patient was seen at the request of Dr Kelly    HPI: Zeyad Horan is a 71 y.o. male who presents with complaints of pain to left hip.  This started today after fall.  The pain is 5/10 and is described as sharp.  The pain is made worse by palpation of the area and made better by rest and immobilization.    Past Medical History:   Diagnosis Date   • Epilepsy (HCC)        No past surgical history on file.    Medications  No current facility-administered medications on file prior to encounter.     Current Outpatient Medications on File Prior to Encounter   Medication Sig Dispense Refill   • lisinopril (PRINIVIL) 5 MG Tab Take 5 mg by mouth every morning.     • phenytoin ER (DILANTIN) 100 MG Cap Take 200 mg by mouth at bedtime.     • simvastatin (ZOCOR) 5 MG Tab Take 5 mg by mouth every evening.         Allergies  Nkda [no known drug allergy]    ROS  Left hip pain. All other systems were reviewed and found to be negative    No family history on file.    Social History     Socioeconomic History   • Marital status:      Spouse name: Not on file   • Number of children: Not on file   • Years of education: Not on file   • Highest education level: Not on file   Occupational History   • Not on file   Tobacco Use   • Smoking status: Current Every Day Smoker     Packs/day: 1.00     Years: 40.00     Pack years: 40.00   • Smokeless tobacco: Never Used   • Tobacco comment: rolls his own nicatine stains on figer tips.   Substance and Sexual Activity   • Alcohol use: Yes   • Drug use: No   • Sexual activity: Not on file   Other Topics Concern   • Not on file   Social History Narrative    ** Merged History Encounter **          Social Determinants of Health     Financial Resource Strain:    • Difficulty of Paying Living Expenses: Not on file   Food Insecurity:    • Worried About Running Out of Food in the Last Year: Not on file   • Ran Out of Food in the Last Year:  "Not on file   Transportation Needs:    • Lack of Transportation (Medical): Not on file   • Lack of Transportation (Non-Medical): Not on file   Physical Activity:    • Days of Exercise per Week: Not on file   • Minutes of Exercise per Session: Not on file   Stress:    • Feeling of Stress : Not on file   Social Connections:    • Frequency of Communication with Friends and Family: Not on file   • Frequency of Social Gatherings with Friends and Family: Not on file   • Attends Alevism Services: Not on file   • Active Member of Clubs or Organizations: Not on file   • Attends Club or Organization Meetings: Not on file   • Marital Status: Not on file   Intimate Partner Violence:    • Fear of Current or Ex-Partner: Not on file   • Emotionally Abused: Not on file   • Physically Abused: Not on file   • Sexually Abused: Not on file   Housing Stability:    • Unable to Pay for Housing in the Last Year: Not on file   • Number of Places Lived in the Last Year: Not on file   • Unstable Housing in the Last Year: Not on file       Physical Exam  Vitals  /85   Pulse 96   Temp 36.6 °C (97.8 °F) (Temporal)   Resp 20   Ht 1.803 m (5' 11\")   Wt 68 kg (150 lb)   SpO2 100%   General: Well Developed, Well Nourished, Age appropriate appearance  HEENT: Normocephalic, atraumatic  Psych: Normal mood and affect  Neck: Supple, nontender, no masses  Lungs: Breathing unlabored, No audible wheezing  Heart: Regular heart rate and rhythm  Abdomen: Soft, NT, ND  Neuro: Sensation grossly intact to BUE and BLE, moving all four extremities  Skin: Intact, no open wounds  Vascular: 2+DP/PT, Capillary refill <2 seconds  MSK: Left hip pain and deformity      Radiographs:  CT-PELVIS W/O PLUS RECONS   Final Result      1.  Severely comminuted, displaced and angulated fracture of the LEFT femoral neck.   2.  No pelvic fracture.   3.  Diffuse osteopenia.   4.  Markedly distended bladder.      CT-HEAD W/O   Final Result      1.  Diffuse atrophy and " white matter changes.   2.  No acute intracranial hemorrhage or territorial infarct.   3.  Frontal encephalomalacia again seen.   4.  Chronic LEFT occipital infarct.             Laboratory Values  Recent Labs     01/26/22 1938   WBC 10.7   RBC 3.01*   HEMOGLOBIN 9.5*   HEMATOCRIT 28.8*   MCV 95.7   MCH 31.6   MCHC 33.0*   RDW 51.1*   PLATELETCT 243   MPV 9.9     Recent Labs     01/26/22 1938 01/27/22  0334   SODIUM 132* 133*   POTASSIUM 3.6 4.1   CHLORIDE 99 103   CO2 20 20   GLUCOSE 241* 119*   BUN 25* 26*   CPKTOTAL 145  --      Recent Labs     01/26/22 1938   APTT 31.8   INR 1.13         Impression:Displaced left femoral neck fracture    Plan:We discussed the diagnosis and findings with the patient at length.  We reviewed possible non operative and operative interventions and the risks and benefits of each of these.  he had a chance to ask questions and all of these were answered to his satisfaction. The patient chose to proceed with  operative intervention. Risks and benefits of surgery were discussed which include but are not limited to bleeding, infection, neurovascular damage, malunion, nonunion, instability, limb length discrepancy, DVT, PE, MI, Stroke and death. They understand these risks and wish to proceed.

## 2022-01-28 ENCOUNTER — APPOINTMENT (OUTPATIENT)
Dept: RADIOLOGY | Facility: MEDICAL CENTER | Age: 72
DRG: 521 | End: 2022-01-28
Attending: STUDENT IN AN ORGANIZED HEALTH CARE EDUCATION/TRAINING PROGRAM
Payer: MEDICARE

## 2022-01-28 PROBLEM — D72.829 LEUKOCYTOSIS: Status: ACTIVE | Noted: 2022-01-28

## 2022-01-28 LAB
ANION GAP SERPL CALC-SCNC: 11 MMOL/L (ref 7–16)
BASOPHILS # BLD AUTO: 0.2 % (ref 0–1.8)
BASOPHILS # BLD: 0.02 K/UL (ref 0–0.12)
BUN SERPL-MCNC: 25 MG/DL (ref 8–22)
CALCIUM SERPL-MCNC: 8.7 MG/DL (ref 8.5–10.5)
CHLORIDE SERPL-SCNC: 100 MMOL/L (ref 96–112)
CO2 SERPL-SCNC: 22 MMOL/L (ref 20–33)
CREAT SERPL-MCNC: 1.32 MG/DL (ref 0.5–1.4)
EOSINOPHIL # BLD AUTO: 0 K/UL (ref 0–0.51)
EOSINOPHIL NFR BLD: 0 % (ref 0–6.9)
ERYTHROCYTE [DISTWIDTH] IN BLOOD BY AUTOMATED COUNT: 52.6 FL (ref 35.9–50)
GLUCOSE BLD-MCNC: 122 MG/DL (ref 65–99)
GLUCOSE BLD-MCNC: 125 MG/DL (ref 65–99)
GLUCOSE BLD-MCNC: 129 MG/DL (ref 65–99)
GLUCOSE SERPL-MCNC: 179 MG/DL (ref 65–99)
HCT VFR BLD AUTO: 35 % (ref 42–52)
HGB BLD-MCNC: 11.2 G/DL (ref 14–18)
IMM GRANULOCYTES # BLD AUTO: 0.07 K/UL (ref 0–0.11)
IMM GRANULOCYTES NFR BLD AUTO: 0.6 % (ref 0–0.9)
LYMPHOCYTES # BLD AUTO: 0.39 K/UL (ref 1–4.8)
LYMPHOCYTES NFR BLD: 3.1 % (ref 22–41)
MCH RBC QN AUTO: 30.8 PG (ref 27–33)
MCHC RBC AUTO-ENTMCNC: 32 G/DL (ref 33.7–35.3)
MCV RBC AUTO: 96.2 FL (ref 81.4–97.8)
MONOCYTES # BLD AUTO: 0.6 K/UL (ref 0–0.85)
MONOCYTES NFR BLD AUTO: 4.8 % (ref 0–13.4)
NEUTROPHILS # BLD AUTO: 11.41 K/UL (ref 1.82–7.42)
NEUTROPHILS NFR BLD: 91.3 % (ref 44–72)
NRBC # BLD AUTO: 0 K/UL
NRBC BLD-RTO: 0 /100 WBC
PLATELET # BLD AUTO: 232 K/UL (ref 164–446)
PMV BLD AUTO: 10.4 FL (ref 9–12.9)
POTASSIUM SERPL-SCNC: 4.7 MMOL/L (ref 3.6–5.5)
RBC # BLD AUTO: 3.64 M/UL (ref 4.7–6.1)
SODIUM SERPL-SCNC: 133 MMOL/L (ref 135–145)
WBC # BLD AUTO: 12.5 K/UL (ref 4.8–10.8)

## 2022-01-28 PROCEDURE — 700102 HCHG RX REV CODE 250 W/ 637 OVERRIDE(OP): Performed by: ORTHOPAEDIC SURGERY

## 2022-01-28 PROCEDURE — A9270 NON-COVERED ITEM OR SERVICE: HCPCS | Performed by: ORTHOPAEDIC SURGERY

## 2022-01-28 PROCEDURE — 97166 OT EVAL MOD COMPLEX 45 MIN: CPT

## 2022-01-28 PROCEDURE — 99024 POSTOP FOLLOW-UP VISIT: CPT | Performed by: ORTHOPAEDIC SURGERY

## 2022-01-28 PROCEDURE — 700105 HCHG RX REV CODE 258: Performed by: STUDENT IN AN ORGANIZED HEALTH CARE EDUCATION/TRAINING PROGRAM

## 2022-01-28 PROCEDURE — 97161 PT EVAL LOW COMPLEX 20 MIN: CPT

## 2022-01-28 PROCEDURE — 85025 COMPLETE CBC W/AUTO DIFF WBC: CPT

## 2022-01-28 PROCEDURE — 700102 HCHG RX REV CODE 250 W/ 637 OVERRIDE(OP): Performed by: INTERNAL MEDICINE

## 2022-01-28 PROCEDURE — 99232 SBSQ HOSP IP/OBS MODERATE 35: CPT | Performed by: INTERNAL MEDICINE

## 2022-01-28 PROCEDURE — 72170 X-RAY EXAM OF PELVIS: CPT

## 2022-01-28 PROCEDURE — 82962 GLUCOSE BLOOD TEST: CPT | Mod: 91

## 2022-01-28 PROCEDURE — 700111 HCHG RX REV CODE 636 W/ 250 OVERRIDE (IP): Performed by: INTERNAL MEDICINE

## 2022-01-28 PROCEDURE — 36415 COLL VENOUS BLD VENIPUNCTURE: CPT

## 2022-01-28 PROCEDURE — 700111 HCHG RX REV CODE 636 W/ 250 OVERRIDE (IP): Performed by: ORTHOPAEDIC SURGERY

## 2022-01-28 PROCEDURE — 85379 FIBRIN DEGRADATION QUANT: CPT

## 2022-01-28 PROCEDURE — 770001 HCHG ROOM/CARE - MED/SURG/GYN PRIV*

## 2022-01-28 PROCEDURE — 80048 BASIC METABOLIC PNL TOTAL CA: CPT

## 2022-01-28 RX ORDER — SODIUM CHLORIDE, SODIUM LACTATE, POTASSIUM CHLORIDE, AND CALCIUM CHLORIDE .6; .31; .03; .02 G/100ML; G/100ML; G/100ML; G/100ML
1000 INJECTION, SOLUTION INTRAVENOUS ONCE
Status: COMPLETED | OUTPATIENT
Start: 2022-01-28 | End: 2022-01-28

## 2022-01-28 RX ORDER — ACETAMINOPHEN 325 MG/1
650 TABLET ORAL EVERY 6 HOURS PRN
Status: DISCONTINUED | OUTPATIENT
Start: 2022-02-02 | End: 2022-01-31 | Stop reason: HOSPADM

## 2022-01-28 RX ORDER — ENEMA 19; 7 G/133ML; G/133ML
1 ENEMA RECTAL
Status: DISCONTINUED | OUTPATIENT
Start: 2022-01-28 | End: 2022-01-31 | Stop reason: HOSPADM

## 2022-01-28 RX ORDER — AMOXICILLIN 250 MG
1 CAPSULE ORAL
Status: DISCONTINUED | OUTPATIENT
Start: 2022-01-28 | End: 2022-01-31 | Stop reason: HOSPADM

## 2022-01-28 RX ORDER — OXYCODONE HYDROCHLORIDE 10 MG/1
10 TABLET ORAL
Status: DISCONTINUED | OUTPATIENT
Start: 2022-01-28 | End: 2022-01-31 | Stop reason: HOSPADM

## 2022-01-28 RX ORDER — ACETAMINOPHEN 325 MG/1
650 TABLET ORAL EVERY 6 HOURS
Status: DISCONTINUED | OUTPATIENT
Start: 2022-01-28 | End: 2022-01-31 | Stop reason: HOSPADM

## 2022-01-28 RX ORDER — DIPHENHYDRAMINE HYDROCHLORIDE 50 MG/ML
25 INJECTION INTRAMUSCULAR; INTRAVENOUS EVERY 6 HOURS PRN
Status: DISCONTINUED | OUTPATIENT
Start: 2022-01-28 | End: 2022-01-31 | Stop reason: HOSPADM

## 2022-01-28 RX ORDER — DEXAMETHASONE SODIUM PHOSPHATE 4 MG/ML
4 INJECTION, SOLUTION INTRA-ARTICULAR; INTRALESIONAL; INTRAMUSCULAR; INTRAVENOUS; SOFT TISSUE
Status: DISCONTINUED | OUTPATIENT
Start: 2022-01-28 | End: 2022-01-31 | Stop reason: HOSPADM

## 2022-01-28 RX ORDER — OXYCODONE HYDROCHLORIDE 5 MG/1
5 TABLET ORAL
Status: DISCONTINUED | OUTPATIENT
Start: 2022-01-28 | End: 2022-01-31 | Stop reason: HOSPADM

## 2022-01-28 RX ORDER — IBUPROFEN 800 MG/1
800 TABLET ORAL 3 TIMES DAILY PRN
Status: DISCONTINUED | OUTPATIENT
Start: 2022-01-31 | End: 2022-01-28

## 2022-01-28 RX ORDER — AMOXICILLIN 250 MG
1 CAPSULE ORAL NIGHTLY
Status: DISCONTINUED | OUTPATIENT
Start: 2022-01-28 | End: 2022-01-31 | Stop reason: HOSPADM

## 2022-01-28 RX ORDER — ONDANSETRON 2 MG/ML
4 INJECTION INTRAMUSCULAR; INTRAVENOUS EVERY 4 HOURS PRN
Status: DISCONTINUED | OUTPATIENT
Start: 2022-01-28 | End: 2022-01-31 | Stop reason: HOSPADM

## 2022-01-28 RX ORDER — HYDROMORPHONE HYDROCHLORIDE 1 MG/ML
0.5 INJECTION, SOLUTION INTRAMUSCULAR; INTRAVENOUS; SUBCUTANEOUS
Status: DISCONTINUED | OUTPATIENT
Start: 2022-01-28 | End: 2022-01-31 | Stop reason: HOSPADM

## 2022-01-28 RX ORDER — KETOROLAC TROMETHAMINE 30 MG/ML
15 INJECTION, SOLUTION INTRAMUSCULAR; INTRAVENOUS EVERY 6 HOURS
Status: DISCONTINUED | OUTPATIENT
Start: 2022-01-28 | End: 2022-01-28

## 2022-01-28 RX ORDER — SCOLOPAMINE TRANSDERMAL SYSTEM 1 MG/1
1 PATCH, EXTENDED RELEASE TRANSDERMAL
Status: DISCONTINUED | OUTPATIENT
Start: 2022-01-28 | End: 2022-01-31 | Stop reason: HOSPADM

## 2022-01-28 RX ORDER — DOCUSATE SODIUM 100 MG/1
100 CAPSULE, LIQUID FILLED ORAL 2 TIMES DAILY
Status: DISCONTINUED | OUTPATIENT
Start: 2022-01-28 | End: 2022-01-31 | Stop reason: HOSPADM

## 2022-01-28 RX ORDER — POLYETHYLENE GLYCOL 3350 17 G/17G
1 POWDER, FOR SOLUTION ORAL 2 TIMES DAILY PRN
Status: DISCONTINUED | OUTPATIENT
Start: 2022-01-28 | End: 2022-01-31 | Stop reason: HOSPADM

## 2022-01-28 RX ORDER — BISACODYL 10 MG
10 SUPPOSITORY, RECTAL RECTAL
Status: DISCONTINUED | OUTPATIENT
Start: 2022-01-28 | End: 2022-01-31 | Stop reason: HOSPADM

## 2022-01-28 RX ORDER — HALOPERIDOL 5 MG/ML
1 INJECTION INTRAMUSCULAR EVERY 6 HOURS PRN
Status: DISCONTINUED | OUTPATIENT
Start: 2022-01-28 | End: 2022-01-31 | Stop reason: HOSPADM

## 2022-01-28 RX ADMIN — DOCUSATE SODIUM 100 MG: 100 CAPSULE, LIQUID FILLED ORAL at 17:00

## 2022-01-28 RX ADMIN — HEPARIN SODIUM 5000 UNITS: 5000 INJECTION, SOLUTION INTRAVENOUS; SUBCUTANEOUS at 04:52

## 2022-01-28 RX ADMIN — DOCUSATE SODIUM 50 MG AND SENNOSIDES 8.6 MG 1 TABLET: 8.6; 5 TABLET, FILM COATED ORAL at 21:31

## 2022-01-28 RX ADMIN — ACETAMINOPHEN 650 MG: 325 TABLET, FILM COATED ORAL at 11:27

## 2022-01-28 RX ADMIN — SODIUM CHLORIDE, POTASSIUM CHLORIDE, SODIUM LACTATE AND CALCIUM CHLORIDE 1000 ML: 600; 310; 30; 20 INJECTION, SOLUTION INTRAVENOUS at 23:00

## 2022-01-28 RX ADMIN — ACETAMINOPHEN 650 MG: 325 TABLET, FILM COATED ORAL at 17:00

## 2022-01-28 RX ADMIN — KETOROLAC TROMETHAMINE 15 MG: 30 INJECTION, SOLUTION INTRAMUSCULAR at 11:27

## 2022-01-28 RX ADMIN — INSULIN HUMAN 3 UNITS: 100 INJECTION, SOLUTION PARENTERAL at 21:28

## 2022-01-28 RX ADMIN — CEFAZOLIN 2 G: 10 INJECTION, POWDER, FOR SOLUTION INTRAVENOUS at 06:16

## 2022-01-28 ASSESSMENT — ENCOUNTER SYMPTOMS
SHORTNESS OF BREATH: 0
VOMITING: 0
STRIDOR: 0
MYALGIAS: 0
SORE THROAT: 0
NECK PAIN: 0
PALPITATIONS: 0
DIZZINESS: 0
NAUSEA: 0
HEADACHES: 0
BRUISES/BLEEDS EASILY: 0
FEVER: 0
COUGH: 0
WEIGHT LOSS: 0
DEPRESSION: 0
BLURRED VISION: 0
DOUBLE VISION: 0
INSOMNIA: 0

## 2022-01-28 ASSESSMENT — ACTIVITIES OF DAILY LIVING (ADL): TOILETING: INDEPENDENT

## 2022-01-28 ASSESSMENT — COGNITIVE AND FUNCTIONAL STATUS - GENERAL
CLIMB 3 TO 5 STEPS WITH RAILING: TOTAL
SUGGESTED CMS G CODE MODIFIER DAILY ACTIVITY: CK
TOILETING: A LOT
TURNING FROM BACK TO SIDE WHILE IN FLAT BAD: A LOT
DRESSING REGULAR LOWER BODY CLOTHING: A LOT
MOBILITY SCORE: 9
SUGGESTED CMS G CODE MODIFIER MOBILITY: CM
HELP NEEDED FOR BATHING: A LOT
MOVING FROM LYING ON BACK TO SITTING ON SIDE OF FLAT BED: UNABLE
DAILY ACTIVITIY SCORE: 16
DRESSING REGULAR UPPER BODY CLOTHING: A LITTLE
WALKING IN HOSPITAL ROOM: A LOT
MOVING TO AND FROM BED TO CHAIR: UNABLE
PERSONAL GROOMING: A LITTLE
STANDING UP FROM CHAIR USING ARMS: A LOT

## 2022-01-28 ASSESSMENT — PAIN DESCRIPTION - PAIN TYPE
TYPE: ACUTE PAIN
TYPE: ACUTE PAIN

## 2022-01-28 ASSESSMENT — GAIT ASSESSMENTS
GAIT LEVEL OF ASSIST: MODERATE ASSIST
DISTANCE (FEET): 2
ASSISTIVE DEVICE: FRONT WHEEL WALKER
DEVIATION: ANTALGIC;STEP TO;DECREASED BASE OF SUPPORT;DECREASED HEEL STRIKE;DECREASED TOE OFF

## 2022-01-28 NOTE — PROGRESS NOTES
[facility] Medicine Daily Progress Note    Date of Service  1/28/2022    Chief Complaint  Zeyad Horan is a 71 y.o. male admitted 1/26/2022 with seizure, fall and hip fracture     Hospital Course  This is a 70 y/o M with PMHX of dementia, seizures, who was admitted on 1/26/22 after presenting to ER complaining of left hip after a fall. Patient had witnessed seizure episode at group home where he lives and fell down. He was having hip pain after it.  Here in ER he was found to have  Left hip fracture, ortho consulted and patient admitted for further treatment.    Interval Problem Update  Patient seen and examined, afebrile, resting in bed, no nausea or vomiting, complains of left hip pain. Ortho consulted and plan for surgical intervention today.  I have also discussed case with neurology regarding his seizure episode and if his seizure medication needs to change, but his dilantin levels are undetectable so unsure if he was getting his dilantin and as per neurology can continue on dilantin     1/28- no event. Denies pain, he tells me that he had seizures.  Hypotensive last night but improving during the day.  Placement per PT/OT    I have personally seen and examined the patient at bedside. I discussed the plan of care with patient and bedside RN.    Consultants/Specialty  neurology and orthopedics    Code Status  Full Code    Disposition  Patient is medically cleared for discharge.   Anticipate discharge to to skilled nursing facility.  I have placed the appropriate orders for post-discharge needs.    Review of Systems  Review of Systems   Constitutional: Negative for fever, malaise/fatigue and weight loss.   HENT: Negative for sore throat and tinnitus.    Eyes: Negative for blurred vision and double vision.   Respiratory: Negative for cough, shortness of breath and stridor.    Cardiovascular: Negative for chest pain and palpitations.   Gastrointestinal: Negative for nausea and vomiting.   Genitourinary: Negative  for dysuria and urgency.   Musculoskeletal: Positive for joint pain. Negative for myalgias and neck pain.   Skin: Negative for itching and rash.   Neurological: Negative for dizziness and headaches.   Endo/Heme/Allergies: Does not bruise/bleed easily.   Psychiatric/Behavioral: Negative for depression. The patient does not have insomnia.    All other systems reviewed and are negative.       Physical Exam  Temp:  [35.9 °C (96.6 °F)-36.7 °C (98.1 °F)] 36.4 °C (97.6 °F)  Pulse:  [83-96] 94  Resp:  [16-25] 17  BP: ()/(51-76) 105/60  SpO2:  [92 %-100 %] 95 %    Physical Exam  Vitals and nursing note reviewed.   Constitutional:       General: He is in acute distress.      Appearance: Normal appearance. He is normal weight. He is not toxic-appearing.   HENT:      Head: Normocephalic and atraumatic.      Nose: Nose normal. No congestion or rhinorrhea.      Mouth/Throat:      Mouth: Mucous membranes are moist.      Pharynx: Oropharynx is clear.   Eyes:      General: No scleral icterus.     Conjunctiva/sclera: Conjunctivae normal.   Neck:      Vascular: No carotid bruit.   Cardiovascular:      Rate and Rhythm: Normal rate and regular rhythm.      Pulses: Normal pulses.      Heart sounds: Normal heart sounds. No murmur heard.  No gallop.    Pulmonary:      Effort: No respiratory distress.      Breath sounds: Normal breath sounds. No wheezing or rales.   Abdominal:      General: Abdomen is flat. Bowel sounds are normal. There is no distension.      Palpations: Abdomen is soft.      Tenderness: There is no abdominal tenderness. There is no guarding.   Musculoskeletal:         General: Swelling and tenderness present. No signs of injury.      Cervical back: Normal range of motion and neck supple. No muscular tenderness.      Comments: Left lower extremity range of motion limited by pain   Lymphadenopathy:      Cervical: No cervical adenopathy.   Skin:     Capillary Refill: Capillary refill takes less than 2 seconds.       Coloration: Skin is not jaundiced or pale.      Findings: No bruising.   Neurological:      General: No focal deficit present.      Mental Status: He is alert. Mental status is at baseline.      Cranial Nerves: No cranial nerve deficit.      Motor: No weakness.      Coordination: Coordination normal.   Psychiatric:         Mood and Affect: Mood normal.         Thought Content: Thought content normal.         Judgment: Judgment normal.         Fluids    Intake/Output Summary (Last 24 hours) at 1/28/2022 1517  Last data filed at 1/27/2022 1810  Gross per 24 hour   Intake 1200 ml   Output 25 ml   Net 1175 ml       Laboratory  Recent Labs     01/26/22 1938 01/28/22  0200   WBC 10.7 12.5*   RBC 3.01* 3.64*   HEMOGLOBIN 9.5* 11.2*   HEMATOCRIT 28.8* 35.0*   MCV 95.7 96.2   MCH 31.6 30.8   MCHC 33.0* 32.0*   RDW 51.1* 52.6*   PLATELETCT 243 232   MPV 9.9 10.4     Recent Labs     01/26/22 1938 01/27/22  0334 01/28/22  0200   SODIUM 132* 133* 133*   POTASSIUM 3.6 4.1 4.7   CHLORIDE 99 103 100   CO2 20 20 22   GLUCOSE 241* 119* 179*   BUN 25* 26* 25*   CREATININE 1.36 1.18 1.32   CALCIUM 8.3* 8.5 8.7     Recent Labs     01/26/22 1938   APTT 31.8   INR 1.13               Imaging  DX-PELVIS-1 OR 2 VIEWS   Final Result      Postoperative changes left hip hemiarthroplasty without immediate postoperative hardware complication.      CT-PELVIS W/O PLUS RECONS   Final Result      1.  Severely comminuted, displaced and angulated fracture of the LEFT femoral neck.   2.  No pelvic fracture.   3.  Diffuse osteopenia.   4.  Markedly distended bladder.      CT-HEAD W/O   Final Result      1.  Diffuse atrophy and white matter changes.   2.  No acute intracranial hemorrhage or territorial infarct.   3.  Frontal encephalomalacia again seen.   4.  Chronic LEFT occipital infarct.              Assessment/Plan  * S/p left hip fracture- (present on admission)  Assessment & Plan  Subsequent to seizure.  S/p Open treatment of left femoral  fracture, proximal end, neck with prosthetic replacement 1/27/2022  Pain management   Needs placement     Leukocytosis- (present on admission)  Assessment & Plan  Reactive  Continue to monitor    Hyperglycemia- (present on admission)  Assessment & Plan   regular insulin sliding scale before every meal as needed  A1c 5.9     Anemia- (present on admission)  Assessment & Plan  Uncertain cause, check iron panel  Monitor , transfuse if below 7   1/28- stable.  Anemia of chronic disease.  No active bleeding.      Seizure (HCC)- (present on admission)  Assessment & Plan  Patient lives in a group and as per him he has taken all his medication they give him.  neurology was consulted   Dilantin level are almost undetectable. Per neurology cont with dilantin   No changes on his seizure medication        Dementia (HCC)- (present on admission)  Assessment & Plan  In no acute distress, supportive care       VTE prophylaxis: heparin ppx    I have performed a physical exam and reviewed and updated ROS and Plan today (1/28/2022). In review of yesterday's note (1/27/2022), there are no changes except as documented above.

## 2022-01-28 NOTE — CARE PLAN
Problem: Fall Risk  Goal: Patient will remain free from falls  Outcome: Progressing     Problem: Knowledge Deficit - Standard  Goal: Patient and family/care givers will demonstrate understanding of plan of care, disease process/condition, diagnostic tests and medications  Outcome: Progressing     Problem: Pain - Standard  Goal: Alleviation of pain or a reduction in pain to the patient’s comfort goal  Outcome: Progressing   The patient is Stable - Low risk of patient condition declining or worsening    Shift Goals  Clinical Goals: pain control, wean oxygen  Patient Goals: more food  Family Goals: GINNY    Progress made toward(s) clinical / shift goals:  pain controlled with tylenol and toradol    Patient is not progressing towards the following goals:

## 2022-01-28 NOTE — ANESTHESIA TIME REPORT
Anesthesia Start and Stop Event Times     Date Time Event    1/27/2022 1643 Ready for Procedure     1658 Anesthesia Start     1810 Anesthesia Stop        Responsible Staff  01/27/22    Name Role Begin End    Tavo Patel M.D. Anesth 1658 1810        Preop Diagnosis (Free Text):  Pre-op Diagnosis     LEFT HIP FRACTURE        Preop Diagnosis (Codes):    Premium Reason  B. 1st Call    Comments: pREMESPERANZA patel

## 2022-01-28 NOTE — DISCHARGE PLANNING
Received Choice form at 1405   Agency/Facility Name: [facility] (1), [facility] (2)  Referral sent per Choice form @ 1405    CM informed

## 2022-01-28 NOTE — OP REPORT
DATE OF OPERATION: 1/27/2022     PREOPERATIVE DIAGNOSIS: Left displaced femoral neck fracture    POSTOPERATIVE DIAGNOSIS: Same    PROCEDURE PERFORMED: Open treatment of left femoral fracture, proximal end, neck with prosthetic replacement    SURGEON: Abdiel Kirkland M.D.     ASSISTANT: Laci Hernandez MD    ANESTHESIOLOGIST: Tavo Ramos MD.    ANESTHESIA: General    ESTIMATED BLOOD LOSS: 50 mL    INDICATIONS: The patient is a 71 y.o. male with a left femoral neck fracture resulting from a ground level fall.  The patient denies antecedent pain, and was found to have a normal neurovascular exam and skin envelope.  Radiographs reviewed by myself demonstrated a displaced femoral neck fracture.  Given these findings, the patient is a candidate for surgical treatment of the femoral neck fracture with hemiarthroplasty.  I discussed the risks and benefits of the procedure, including the risks of infection, wound healing complication, neurovascular injury, instability, limb length discrepancy, need for revision surgery, and the medical risks of anesthesia including DVT, PE, MI, stroke, and death.  Benefits include early mobilization and reduction in the medical risks of hip fractures.  Alternatives to surgery were also discussed, including non-operative management, percutaneous screw fixation and total hip arthroplasty.  The patient was in agreement with the plan to proceed, the informed consent was signed and documented and the operative extremity was marked.      PROCEDURE:  The patient was sedated with general anesthesia, and positioned in the lateral decubitus position on a beanbag with all bony prominences well padded and an axillary roll placed.  Perioperative antibiotics were administered. Sequential compression devices were employed.  The correct operative site was prepped and draped into a sterile field.  A procedural pause was conducted to verify correct patient, correct extremity, and presence of the surgeons  initials on the operative extremity.    A posterior approach to the hip was performed with care taken to avoid all neurovascular structures.  The fascia was split in line with the incision to the tip of the greater troch, then curved posteriorly to parallel the gluteal fibers.  The short external rotators and capsule were taken down en bloc and tagged with No 5 Ticron suture for future repair. The labrum was preserved and the sciatic nerve was protected at all times.    A femoral neck cut was made one finger breadth above the lesser trochanter and the femoral head was removed without difficulty.  The acetabulum was inspected and cleared of foreign material.  A femoral neck retractor was placed, and the canal was sequentially reamed and broached to a size CR5.  After preparation of the canal, a cement restrictor was placed. A PowerDsine Ion size CR5 stem was cemented in the appropriate version using third generation cement techniques.    Once cement had dried completely a 26+0mm head and 54mm shell were impacted. The hip was then reduced and stability was tested. Hip was stable in full extension and external rotation and stable to 90 degrees internal rotation at 90 degrees forward flexion. Wounds were irrigated and capsule was closed to greater trochanter with #5 Ticron sutures.  The wound was then closed in layers, with #1 Vicryl in the fascia, 2-0 vicryl in the subcutaneous tissue, and staples in the skin.  Sterile dressings were applied, and the patient was transferred to PACU in stable condition.    The patient tolerated the procedure well. There were no apparent complications. All sponge, needle, and instrument counts were correct on two separate occasions. He was awakened, extubated, and transferred to the recovery room in satisfactory condition.     The use of Laci Hernandez as a surgical assistant was necessary for assistance with exposure, retraction, fracture reduction, instrumentation, and  closure.      Post-Operative Plan:    1.  The patient should bear weight as tolerated on their operative extremity with posterior hip precautions.  Gait aids (crutch or crutches, cane, walker) may be used as needed, and may be discontinued when no longer required.  2.  IV antibiotics - may be continued for 24 hours  3.  DVT prophylaxis - SCD's and Lovenox 40 mg SQ daily while inpatient.  The patient may transition to Aspirin 325 mg PO BID as an outpatient  4.  Discharge planning  ____________________________________   Abdiel Kirkland M.D.   DD: 1/27/2022  6:14 PM

## 2022-01-28 NOTE — CARE PLAN
The patient is Stable - Low risk of patient condition declining or worsening    Shift Goals  Clinical Goals: Maintain neuro status/Titrate O2  Patient Goals: Sleep  Family Goals: GINNY    Progress made toward(s) clinical / shift goals: Stable Q4hr neuro checks. Treaded slippers are on, bed is low and locked, bed alarm is on, call light is within reach, hourly rounding continues.     Problem: Fall Risk  Goal: Patient will remain free from falls  Outcome: Progressing     Problem: Knowledge Deficit - Standard  Goal: Patient and family/care givers will demonstrate understanding of plan of care, disease process/condition, diagnostic tests and medications  Outcome: Progressing     Patient is not progressing towards the following goals: Unable to titrate O2. Patient is maintaining adequate oxygen saturation at 2L of O2 via NC.

## 2022-01-28 NOTE — OR NURSING
Patient recovered well in post-op. AAOx3, disoriented to time. VSS, on 2L via NC. Surgical sites GINNY, surgical dressing in place CDI. Patient denies pain throughout recovery. Patient belongings retrieved and on [facility]. Report called to ABDI Cortes. Patient transported to [facility]. O2 tank full for transport.

## 2022-01-28 NOTE — DISCHARGE PLANNING
Received Choice form at 1409   Agency/Facility Name: [facility] (1), [facility] (2), [facility] (3).   Referral sent per Choice form @ 1405    CM informed

## 2022-01-28 NOTE — ANESTHESIA PREPROCEDURE EVALUATION
Case: [id number] Date/Time: 01/27/22 1649    Procedure: HEMIARTHROPLASTY, HIP (Left Hip)    Anesthesia type: General    Pre-op diagnosis: LEFT HIP FRACTURE    Location: [place] [address] / SURGERY [facility]    Surgeons: Abdiel Kirkland M.D.          Relevant Problems   NEURO   (positive) S/p left hip fracture   (positive) Seizure (HCC)       Physical Exam    Airway   Mallampati: II  TM distance: >3 FB  Neck ROM: full       Cardiovascular - normal exam  Rhythm: regular  Rate: normal  (-) murmur     Dental - normal exam           Pulmonary - normal exam  Breath sounds clear to auscultation     Abdominal    Neurological - normal exam                 Anesthesia Plan    ASA 3- EMERGENT   ASA physical status 3 criteria: other (comment)ASA physical status emergent criteria: acute ischemia (limb, body part, tissue) and displaced fracture with possible neurovascular compromise    Plan - general       Airway plan will be ETT          Induction: intravenous    Postoperative Plan: Postoperative administration of opioids is intended.    Pertinent diagnostic labs and testing reviewed    Informed Consent:    Anesthetic plan and risks discussed with patient.    Use of blood products discussed with: patient whom consented to blood products.

## 2022-01-28 NOTE — DISCHARGE PLANNING
Anticipated Discharge Disposition: Rehab vs SNF    Action: RNCM obtained Rehab choices: 1) [facility], 2) [facility] and SNF choices: 1) [facility], 2) [facility], 3) [facility]. Questions answered. Patient would like to be considered by inpatient rehab first.    Insurance: Medicare    Barriers to Discharge: SNF acceptance and bed availability, medical clearance    Plan: [facility] will provide patient with acceptance and/or denial updates as they roll in. Will continue to follow to assist with dc needs.

## 2022-01-28 NOTE — ANESTHESIA PROCEDURE NOTES
Airway    Date/Time: 1/27/2022 5:02 PM  Performed by: Tavo Ramos M.D.  Authorized by: Tavo Ramos M.D.     Location:  OR  Urgency:  Elective  Indications for Airway Management:  Anesthesia      Spontaneous Ventilation: absent    Sedation Level:  Deep  Preoxygenated: Yes    Patient Position:  Sniffing  Final Airway Type:  Endotracheal airway  Final Endotracheal Airway:  ETT  Cuffed: Yes    Technique Used for Successful ETT Placement:  Direct laryngoscopy    Insertion Site:  Oral  Blade Type:  Hilda  Laryngoscope Blade/Videolaryngoscope Blade Size:  3  ETT Size (mm):  7.0  Measured from:  Teeth  ETT to Teeth (cm):  22  Placement Verified by: auscultation and capnometry    Cormack-Lehane Classification:  Grade I - full view of glottis  Number of Attempts at Approach:  1

## 2022-01-28 NOTE — THERAPY
Occupational Therapy   Initial Evaluation     Patient Name: Zeyad Horan  Age:  71 y.o., Sex:  male  Medical Record #: [id number]  Today's Date: 1/28/2022     Precautions  Precautions: (P) Weight Bearing As Tolerated Left Lower Extremity,Posterior Hip Precautions    Assessment  Patient is 71 y.o. male with a diagnosis of L femoral neck Fx, s/p prosthetic replacement.   Pt currently limited by decreased functional mobility, activity tolerance, cognition,  balance, and pain which are affecting pt's ability to complete ADLs/IADLs at baseline. Pt would benefit from OT services in the acute care setting to maximize functional recovery.       Plan    Recommend Occupational Therapy 3 times per week until therapy goals are met for the following treatments:  Self Care/Activities of Daily Living and Therapeutic Activities.       Discharge Recommendations: (P) Recommend post-acute placement for additional occupational therapy services prior to discharge home        01/28/22 0438   Prior Living Situation   Prior Services None   Housing / Facility   (states he lives at the shelter)   Equipment Owned None   Lives with - Patient's Self Care Capacity Unrelated Adult   Comments not sure of pts ablility to give accurate PLOF   Prior Level of ADL Function   Self Feeding Independent   Grooming / Hygiene Independent   Bathing Independent   Dressing Independent   Toileting Independent   ADL Assessment   Grooming Minimal Assist   Upper Body Dressing Moderate Assist   Lower Body Dressing Maximal Assist   Toileting Maximal Assist   Functional Mobility   Sit to Stand Maximal Assist   Bed, Chair, Wheelchair Transfer Unable to Participate   Short Term Goals   Short Term Goal # 1 min A with LB dressing   Short Term Goal # 2 min A with ADl txfs

## 2022-01-28 NOTE — DOCUMENTATION QUERY
[facility]                                                                       Query Response Note      PATIENT:               EDMOND DEJESUS  ACCT #:                  [id number]  MRN:                     [id number]  :                      1950  ADMIT DATE:       2022 6:42 PM  DISCH DATE:          RESPONDING  PROVIDER #:        [id number]           QUERY TEXT:    Left femoral neck fracture is documented in the Medical Record.  Patient had a ground level fall.  Diffuse osteopenia is noted in the  CT of Pelvis. Can the relationship between fracture and osteopenia be clarified?    The patient's Clinical Indicators include:  Per  Progress Note: admitted with seizure, fall and hip fracture   Per OP Report: left femoral neck fracture resulting from a ground level fall.    CT of Pelvis: severely comminuted, displaced and angulated fracture of the LEFT femoral neck.  Diffuse osteopenia  Risk Factors: diffuse osteopenia, seizure episode w/ fall  Treatment: left hip arthroplasty     Thank you,  Debra Schmidt RN, BSN  Clinical   Connect via [email or web address]  Options provided:   -- Left femoral fracture is related to osteopenia (Pathologic fracture ruled in)   -- Left femoral fracture is unrelated to osteopenia   -- Other explanation of clinical findings, -please specify   -- Unable to determine      Query created by: Debra Schmidt on 2022 12:13 PM    RESPONSE TEXT:    Unable to determine          Electronically signed by:  BRAYDEN LEONARDO MD 2022 12:30 PM

## 2022-01-28 NOTE — PROGRESS NOTES
"No events overnight    /60   Pulse 94   Temp 36.4 °C (97.6 °F) (Temporal)   Resp 17   Ht 1.803 m (5' 11\")   Wt 68 kg (150 lb)   SpO2 95%     Recent Labs     01/26/22  1938 01/28/22  0200   WBC 10.7 12.5*   RBC 3.01* 3.64*   HEMOGLOBIN 9.5* 11.2*   HEMATOCRIT 28.8* 35.0*   MCV 95.7 96.2   MCH 31.6 30.8   MCHC 33.0* 32.0*   RDW 51.1* 52.6*   PLATELETCT 243 232   MPV 9.9 10.4       No acute distress  Dressing clean dry and intact  Neurovascularly intact    POD#1  S/P Hemiarthroplasty    Plan:  DVT Prophylaxis- TEDS/SCDs, lovenox while in hospital, ASA 81 mg PO BID as outpatient  Weight Bearing Status-WBAT, posterior hip precautions  PT/OT  Antibiotics: 24 hrs post op  Case Coordination          "

## 2022-01-28 NOTE — PROGRESS NOTES
4 Eyes Skin Assessment Completed by ABDI Cortes and ABDI Davidson.    Head WDL  Ears Redness and Blanching  Nose WDL  Mouth WDL  Neck Scab  Breast/Chest WDL  Shoulder Blades WDL  Spine WDL  (R) Arm/Elbow/Hand Redness and Bruising  (L) Arm/Elbow/Hand Redness, Bruising and Abrasion  Abdomen Scar  Groin WDL  Scrotum/Coccyx/Buttocks WDL  (R) Leg Bruising  (L) Leg Scab and Bruising  (R) Heel/Foot/Toe Redness and Blanching  (L) Heel/Foot/Toe Redness and Blanching          Devices In Places Pulse Ox, SCD's and Nasal Cannula      Interventions In Place Pillows and Pressure Redistribution Mattress    Possible Skin Injury No    Pictures Uploaded Into Epic N/A  Wound Consult Placed N/A  RN Wound Prevention Protocol Ordered No

## 2022-01-28 NOTE — ANESTHESIA POSTPROCEDURE EVALUATION
Patient: Zeyad Horan    Procedure Summary     Date: 01/27/22 Room / Location: [place] [address] / SURGERY [facility]    Anesthesia Start: 1658 Anesthesia Stop: 1810    Procedure: HEMIARTHROPLASTY, HIP (Left Hip) Diagnosis: (LEFT HIP FRACTURE)    Surgeons: Abdiel Kirkland M.D. Responsible Provider: Tavo Ramos M.D.    Anesthesia Type: general ASA Status: 3 - Emergent          Final Anesthesia Type: general  Last vitals  BP   Blood Pressure : 112/70    Temp   36.4 °C (97.6 °F)    Pulse   95   Resp   (!) 25    SpO2   98 %      Anesthesia Post Evaluation    Patient location during evaluation: PACU  Patient participation: complete - patient participated  Level of consciousness: awake and alert  Pain score: 1    Airway patency: patent  Anesthetic complications: no  Cardiovascular status: hemodynamically stable  Respiratory status: acceptable  Hydration status: euvolemic    PONV: none          No complications documented.     Nurse Pain Score: 6 (NPRS)

## 2022-01-29 ENCOUNTER — APPOINTMENT (OUTPATIENT)
Dept: CARDIOLOGY | Facility: MEDICAL CENTER | Age: 72
DRG: 521 | End: 2022-01-29
Attending: INTERNAL MEDICINE
Payer: MEDICARE

## 2022-01-29 ENCOUNTER — APPOINTMENT (OUTPATIENT)
Dept: RADIOLOGY | Facility: MEDICAL CENTER | Age: 72
DRG: 521 | End: 2022-01-29
Attending: STUDENT IN AN ORGANIZED HEALTH CARE EDUCATION/TRAINING PROGRAM
Payer: MEDICARE

## 2022-01-29 PROBLEM — I26.99 ACUTE PULMONARY EMBOLISM (HCC): Status: ACTIVE | Noted: 2022-01-29

## 2022-01-29 PROBLEM — N17.9 ACUTE KIDNEY INJURY SUPERIMPOSED ON CHRONIC KIDNEY DISEASE (HCC): Status: ACTIVE | Noted: 2022-01-29

## 2022-01-29 PROBLEM — N18.9 ACUTE KIDNEY INJURY SUPERIMPOSED ON CHRONIC KIDNEY DISEASE (HCC): Status: ACTIVE | Noted: 2022-01-29

## 2022-01-29 PROBLEM — R59.9 LYMPH NODES ENLARGED: Status: ACTIVE | Noted: 2022-01-29

## 2022-01-29 LAB
ANION GAP SERPL CALC-SCNC: 11 MMOL/L (ref 7–16)
BASOPHILS # BLD AUTO: 0.5 % (ref 0–1.8)
BASOPHILS # BLD: 0.05 K/UL (ref 0–0.12)
BUN SERPL-MCNC: 40 MG/DL (ref 8–22)
CALCIUM SERPL-MCNC: 8.4 MG/DL (ref 8.5–10.5)
CHLORIDE SERPL-SCNC: 102 MMOL/L (ref 96–112)
CO2 SERPL-SCNC: 19 MMOL/L (ref 20–33)
CREAT SERPL-MCNC: 1.99 MG/DL (ref 0.5–1.4)
D DIMER PPP IA.FEU-MCNC: 7.48 UG/ML (FEU) (ref 0–0.5)
EOSINOPHIL # BLD AUTO: 0.1 K/UL (ref 0–0.51)
EOSINOPHIL NFR BLD: 1 % (ref 0–6.9)
ERYTHROCYTE [DISTWIDTH] IN BLOOD BY AUTOMATED COUNT: 52.3 FL (ref 35.9–50)
GLUCOSE BLD-MCNC: 112 MG/DL (ref 65–99)
GLUCOSE BLD-MCNC: 154 MG/DL (ref 65–99)
GLUCOSE BLD-MCNC: 81 MG/DL (ref 65–99)
GLUCOSE BLD-MCNC: 94 MG/DL (ref 65–99)
GLUCOSE SERPL-MCNC: 98 MG/DL (ref 65–99)
HCT VFR BLD AUTO: 29.9 % (ref 42–52)
HGB BLD-MCNC: 9.8 G/DL (ref 14–18)
IMM GRANULOCYTES # BLD AUTO: 0.04 K/UL (ref 0–0.11)
IMM GRANULOCYTES NFR BLD AUTO: 0.4 % (ref 0–0.9)
LV EJECT FRACT  99904: 65
LV EJECT FRACT MOD 2C 99903: 56.89
LV EJECT FRACT MOD 4C 99902: 78.1
LV EJECT FRACT MOD BP 99901: 68.05
LYMPHOCYTES # BLD AUTO: 1.06 K/UL (ref 1–4.8)
LYMPHOCYTES NFR BLD: 10.8 % (ref 22–41)
MCH RBC QN AUTO: 30.6 PG (ref 27–33)
MCHC RBC AUTO-ENTMCNC: 32.8 G/DL (ref 33.7–35.3)
MCV RBC AUTO: 93.4 FL (ref 81.4–97.8)
MONOCYTES # BLD AUTO: 0.88 K/UL (ref 0–0.85)
MONOCYTES NFR BLD AUTO: 8.9 % (ref 0–13.4)
NEUTROPHILS # BLD AUTO: 7.71 K/UL (ref 1.82–7.42)
NEUTROPHILS NFR BLD: 78.4 % (ref 44–72)
NRBC # BLD AUTO: 0 K/UL
NRBC BLD-RTO: 0 /100 WBC
PLATELET # BLD AUTO: 213 K/UL (ref 164–446)
PMV BLD AUTO: 10.3 FL (ref 9–12.9)
POTASSIUM SERPL-SCNC: 5.1 MMOL/L (ref 3.6–5.5)
RBC # BLD AUTO: 3.2 M/UL (ref 4.7–6.1)
SODIUM SERPL-SCNC: 132 MMOL/L (ref 135–145)
WBC # BLD AUTO: 9.8 K/UL (ref 4.8–10.8)

## 2022-01-29 PROCEDURE — 71275 CT ANGIOGRAPHY CHEST: CPT | Mod: ME

## 2022-01-29 PROCEDURE — 700102 HCHG RX REV CODE 250 W/ 637 OVERRIDE(OP): Performed by: INTERNAL MEDICINE

## 2022-01-29 PROCEDURE — 80048 BASIC METABOLIC PNL TOTAL CA: CPT

## 2022-01-29 PROCEDURE — 99233 SBSQ HOSP IP/OBS HIGH 50: CPT | Performed by: INTERNAL MEDICINE

## 2022-01-29 PROCEDURE — 700111 HCHG RX REV CODE 636 W/ 250 OVERRIDE (IP): Performed by: INTERNAL MEDICINE

## 2022-01-29 PROCEDURE — 82962 GLUCOSE BLOOD TEST: CPT

## 2022-01-29 PROCEDURE — A9270 NON-COVERED ITEM OR SERVICE: HCPCS | Performed by: INTERNAL MEDICINE

## 2022-01-29 PROCEDURE — 93306 TTE W/DOPPLER COMPLETE: CPT | Mod: 26 | Performed by: INTERNAL MEDICINE

## 2022-01-29 PROCEDURE — 85025 COMPLETE CBC W/AUTO DIFF WBC: CPT

## 2022-01-29 PROCEDURE — 700117 HCHG RX CONTRAST REV CODE 255: Performed by: STUDENT IN AN ORGANIZED HEALTH CARE EDUCATION/TRAINING PROGRAM

## 2022-01-29 PROCEDURE — 700102 HCHG RX REV CODE 250 W/ 637 OVERRIDE(OP): Performed by: ORTHOPAEDIC SURGERY

## 2022-01-29 PROCEDURE — 99024 POSTOP FOLLOW-UP VISIT: CPT | Performed by: ORTHOPAEDIC SURGERY

## 2022-01-29 PROCEDURE — 36415 COLL VENOUS BLD VENIPUNCTURE: CPT

## 2022-01-29 PROCEDURE — 700111 HCHG RX REV CODE 636 W/ 250 OVERRIDE (IP): Performed by: ORTHOPAEDIC SURGERY

## 2022-01-29 PROCEDURE — A9270 NON-COVERED ITEM OR SERVICE: HCPCS | Performed by: ORTHOPAEDIC SURGERY

## 2022-01-29 PROCEDURE — 93306 TTE W/DOPPLER COMPLETE: CPT

## 2022-01-29 PROCEDURE — 770001 HCHG ROOM/CARE - MED/SURG/GYN PRIV*

## 2022-01-29 RX ADMIN — DOCUSATE SODIUM 100 MG: 100 CAPSULE, LIQUID FILLED ORAL at 17:37

## 2022-01-29 RX ADMIN — ACETAMINOPHEN 650 MG: 325 TABLET, FILM COATED ORAL at 05:45

## 2022-01-29 RX ADMIN — DOCUSATE SODIUM 50 MG AND SENNOSIDES 8.6 MG 1 TABLET: 8.6; 5 TABLET, FILM COATED ORAL at 21:40

## 2022-01-29 RX ADMIN — ENOXAPARIN SODIUM 60 MG: 60 INJECTION SUBCUTANEOUS at 15:06

## 2022-01-29 RX ADMIN — DOCUSATE SODIUM 100 MG: 100 CAPSULE, LIQUID FILLED ORAL at 05:45

## 2022-01-29 RX ADMIN — ACETAMINOPHEN 650 MG: 325 TABLET, FILM COATED ORAL at 00:57

## 2022-01-29 RX ADMIN — ACETAMINOPHEN 650 MG: 325 TABLET, FILM COATED ORAL at 12:05

## 2022-01-29 RX ADMIN — IOHEXOL 50 ML: 350 INJECTION, SOLUTION INTRAVENOUS at 06:20

## 2022-01-29 RX ADMIN — PHENYTOIN SODIUM 200 MG: 100 CAPSULE ORAL at 21:39

## 2022-01-29 RX ADMIN — ACETAMINOPHEN 650 MG: 325 TABLET, FILM COATED ORAL at 17:36

## 2022-01-29 RX ADMIN — ENOXAPARIN SODIUM 40 MG: 40 INJECTION SUBCUTANEOUS at 05:46

## 2022-01-29 ASSESSMENT — ENCOUNTER SYMPTOMS
BRUISES/BLEEDS EASILY: 0
NAUSEA: 0
MYALGIAS: 0
FEVER: 0
DIZZINESS: 0
WEIGHT LOSS: 0
NECK PAIN: 0
PALPITATIONS: 0
BLURRED VISION: 0
INSOMNIA: 0
DOUBLE VISION: 0
COUGH: 0
SORE THROAT: 0
DEPRESSION: 0
STRIDOR: 0
VOMITING: 0
HEADACHES: 0
SHORTNESS OF BREATH: 0

## 2022-01-29 ASSESSMENT — PAIN DESCRIPTION - PAIN TYPE
TYPE: ACUTE PAIN
TYPE: SURGICAL PAIN
TYPE: SURGICAL PAIN

## 2022-01-29 NOTE — PROGRESS NOTES
Interval Summary:         RN contacted myself regarding hypotension, O2 requirement. After chart review, noticed mild tachycardic. Ordered D-dimer, resulted  > 7, CTA reveals bilateral PE as seen above. No Heart Strain. Converted daily ppx lovenox to 1mg/kg BID.     Electronically Signed:    Adrian Gonzalez D.O.

## 2022-01-29 NOTE — PROGRESS NOTES
Notified hospitalist Dr. Gonzalez about patient's consistent BP below 90. Received orders to bolus and for D-dimer.

## 2022-01-29 NOTE — RESPIRATORY CARE
COPD EDUCATION by COPD CLINICAL EDUCATOR  1/29/2022 at 12:47 PM by Elsy Freitas, RRT     Patient is a smoker,he declines  smoking cessation information. He has no desire to quit.

## 2022-01-29 NOTE — ASSESSMENT & PLAN NOTE
Creatinine baseline 1.01-1.18  Cr 1/29- 1.99, GFR 33. Continue to monitor. Pt did have contrast exposure 1/28 .   1/30-slight improvement.  Continue to monitor

## 2022-01-29 NOTE — CARE PLAN
Problem: Fall Risk  Goal: Patient will remain free from falls  Outcome: Progressing     Problem: Knowledge Deficit - Standard  Goal: Patient and family/care givers will demonstrate understanding of plan of care, disease process/condition, diagnostic tests and medications  Outcome: Progressing     Problem: Pain - Standard  Goal: Alleviation of pain or a reduction in pain to the patient’s comfort goal  Outcome: Progressing     Problem: Skin Integrity  Goal: Skin integrity is maintained or improved  Outcome: Progressing   The patient is Watcher - Medium risk of patient condition declining or worsening    Shift Goals  Clinical Goals: Maintain stable blood pressure   Patient Goals: rest  Family Goals: n/a    Progress made toward(s) clinical / shift goals:  Educate patient of available PRN pain medication per MAR. Reinforce fall/safety precautions, monitor blood pressure and abnormal lab result.     Patient is not progressing towards the following goals:

## 2022-01-29 NOTE — PROGRESS NOTES
Hospital Medicine Daily Progress Note    Date of Service  1/29/2022    Chief Complaint  Zeyad Horan is a 71 y.o. male admitted 1/26/2022 with seizure, fall and hip fracture     Hospital Course  This is a 72 y/o M with PMHX of dementia, seizures, who was admitted on 1/26/22 after presenting to ER complaining of left hip after a fall. Patient had witnessed seizure episode at group home where he lives and fell down. He was having hip pain after it.  Here in ER he was found to have  Left hip fracture, ortho consulted and patient admitted for further treatment.    Interval Problem Update  Patient seen and examined, afebrile, resting in bed, no nausea or vomiting, complains of left hip pain. Ortho consulted and plan for surgical intervention today.  I have also discussed case with neurology regarding his seizure episode and if his seizure medication needs to change, but his dilantin levels are undetectable so unsure if he was getting his dilantin and as per neurology can continue on dilantin     1/28- no event. Denies pain, he tells me that he had seizures.  Hypotensive last night but improving during the day.  Placement per PT/OT    1/29-patient again hypotensive later in the evening and tachycardic.  On-call physician did order D-dimers and subsequently CTA chest because of D-dimer significantly elevated.  CTA showed acute bilateral pulmonary emboli, left more than right.  Echocardiogram also ordered to confirm no right heart strain.    I have personally seen and examined the patient at bedside. I discussed the plan of care with patient and bedside RN.    Consultants/Specialty  neurology and orthopedics    Code Status  Full Code    Disposition  Patient is medically cleared for discharge.   Anticipate discharge to to skilled nursing facility.  I have placed the appropriate orders for post-discharge needs.    Review of Systems  Review of Systems   Constitutional: Negative for fever, malaise/fatigue and weight loss.    HENT: Negative for sore throat and tinnitus.    Eyes: Negative for blurred vision and double vision.   Respiratory: Negative for cough, shortness of breath and stridor.    Cardiovascular: Negative for chest pain and palpitations.   Gastrointestinal: Negative for nausea and vomiting.   Genitourinary: Negative for dysuria and urgency.   Musculoskeletal: Positive for joint pain. Negative for myalgias and neck pain.   Skin: Negative for itching and rash.   Neurological: Negative for dizziness and headaches.   Endo/Heme/Allergies: Does not bruise/bleed easily.   Psychiatric/Behavioral: Negative for depression. The patient does not have insomnia.    All other systems reviewed and are negative.       Physical Exam  Temp:  [36.3 °C (97.3 °F)-37.2 °C (99 °F)] 36.3 °C (97.3 °F)  Pulse:  [61-90] 82  Resp:  [17-18] 18  BP: ()/(39-68) 114/68  SpO2:  [91 %-100 %] 100 %    Physical Exam  Vitals and nursing note reviewed.   Constitutional:       General: He is in acute distress.      Appearance: Normal appearance. He is normal weight. He is not toxic-appearing.   HENT:      Head: Normocephalic and atraumatic.      Nose: Nose normal. No congestion or rhinorrhea.      Mouth/Throat:      Mouth: Mucous membranes are moist.      Pharynx: Oropharynx is clear.   Eyes:      General: No scleral icterus.     Conjunctiva/sclera: Conjunctivae normal.   Neck:      Vascular: No carotid bruit.   Cardiovascular:      Rate and Rhythm: Normal rate and regular rhythm.      Pulses: Normal pulses.      Heart sounds: Normal heart sounds. No murmur heard.  No gallop.    Pulmonary:      Effort: No respiratory distress.      Breath sounds: Normal breath sounds. No wheezing or rales.   Abdominal:      General: Abdomen is flat. Bowel sounds are normal. There is no distension.      Palpations: Abdomen is soft.      Tenderness: There is no abdominal tenderness. There is no guarding.   Musculoskeletal:         General: Swelling and tenderness present.  No signs of injury.      Cervical back: Normal range of motion and neck supple. No muscular tenderness.      Comments: Left lower extremity range of motion limited by pain   Lymphadenopathy:      Cervical: No cervical adenopathy.   Skin:     Capillary Refill: Capillary refill takes less than 2 seconds.      Coloration: Skin is not jaundiced or pale.      Findings: No bruising.   Neurological:      General: No focal deficit present.      Mental Status: He is alert. Mental status is at baseline.      Cranial Nerves: No cranial nerve deficit.      Motor: No weakness.      Coordination: Coordination normal.   Psychiatric:         Mood and Affect: Mood normal.         Thought Content: Thought content normal.         Judgment: Judgment normal.         Fluids    Intake/Output Summary (Last 24 hours) at 1/29/2022 1459  Last data filed at 1/29/2022 0900  Gross per 24 hour   Intake 120 ml   Output 200 ml   Net -80 ml       Laboratory  Recent Labs     01/26/22  1938 01/28/22  0200 01/29/22  0507   WBC 10.7 12.5* 9.8   RBC 3.01* 3.64* 3.20*   HEMOGLOBIN 9.5* 11.2* 9.8*   HEMATOCRIT 28.8* 35.0* 29.9*   MCV 95.7 96.2 93.4   MCH 31.6 30.8 30.6   MCHC 33.0* 32.0* 32.8*   RDW 51.1* 52.6* 52.3*   PLATELETCT 243 232 213   MPV 9.9 10.4 10.3     Recent Labs     01/27/22  0334 01/28/22  0200 01/29/22  0507   SODIUM 133* 133* 132*   POTASSIUM 4.1 4.7 5.1   CHLORIDE 103 100 102   CO2 20 22 19*   GLUCOSE 119* 179* 98   BUN 26* 25* 40*   CREATININE 1.18 1.32 1.99*   CALCIUM 8.5 8.7 8.4*     Recent Labs     01/26/22 1938   APTT 31.8   INR 1.13               Imaging  EC-ECHOCARDIOGRAM COMPLETE W/O CONT   Final Result      CT-CTA CHEST PULMONARY ARTERY W/ RECONS   Final Result         1. Acute bilateral pulmonary emboli, left more than right.      2. No elevation of the RV/LV ratio.      3. Patchy bibasilar atelectasis. Trace pericardial effusion.      4. Enlarged subcarinal lymph node, measuring 2.1 x 4.0 cm., of unclear etiology.       Preliminary findings texted to Dr. YEMI LEONE via [employer] on 1/29/2022 6:59 AM      DX-PELVIS-1 OR 2 VIEWS   Final Result      Postoperative changes left hip hemiarthroplasty without immediate postoperative hardware complication.      CT-PELVIS W/O PLUS RECONS   Final Result      1.  Severely comminuted, displaced and angulated fracture of the LEFT femoral neck.   2.  No pelvic fracture.   3.  Diffuse osteopenia.   4.  Markedly distended bladder.      CT-HEAD W/O   Final Result      1.  Diffuse atrophy and white matter changes.   2.  No acute intracranial hemorrhage or territorial infarct.   3.  Frontal encephalomalacia again seen.   4.  Chronic LEFT occipital infarct.              Assessment/Plan  * S/p left hip fracture- (present on admission)  Assessment & Plan  Subsequent to seizure.  S/p Open treatment of left femoral fracture, proximal end, neck with prosthetic replacement 1/27/2022  Pain management   Needs placement     Acute kidney injury superimposed on chronic kidney disease (HCC)  Assessment & Plan  Creatinine baseline 1.01-1.18  Cr 1/29- 1.99, GFR 33. Continue to monitor. Pt did have contrast exposure 1/28 .     Lymph nodes enlarged- (present on admission)  Assessment & Plan  Incidental finding of enlarged carinal lymphoid nodules  Patient is a smoker  Needs follow-up with primary care, consider biopsy if persistent in 3 months    Acute pulmonary embolism (HCC)- (present on admission)  Assessment & Plan  New findings from CTA chest   Started on lovenox. I will transition to xarelto tomorrow   Echo no right heart strain     Leukocytosis- (present on admission)  Assessment & Plan  Reactive  Continue to monitor    Hyperglycemia- (present on admission)  Assessment & Plan   regular insulin sliding scale before every meal as needed  A1c 5.9     Anemia- (present on admission)  Assessment & Plan  Uncertain cause, check iron panel  Monitor , transfuse if below 7   1/28- stable.  Anemia of chronic disease.   No active bleeding.      Seizure (HCC)- (present on admission)  Assessment & Plan  Patient lives in a group and as per him he has taken all his medication they give him.  neurology was consulted   Dilantin level are almost undetectable. Per neurology cont with dilantin   No changes on his seizure medication        Dementia (HCC)- (present on admission)  Assessment & Plan  In no acute distress, supportive care       VTE prophylaxis: heparin ppx    I have performed a physical exam and reviewed and updated ROS and Plan today (1/29/2022). In review of yesterday's note (1/28/2022), there are no changes except as documented above.

## 2022-01-29 NOTE — ASSESSMENT & PLAN NOTE
Incidental finding of enlarged carinal lymphoid nodules  Patient is a smoker  Needs follow-up with primary care, consider biopsy if persistent in 3 months

## 2022-01-29 NOTE — THERAPY
"Physical Therapy   Initial Evaluation     Patient Name: Zeyad Horan  Age:  71 y.o., Sex:  male  Medical Record #: [id number]  Today's Date: 1/28/2022     Precautions  Precautions: Posterior Hip Precautions;Weight Bearing As Tolerated Left Lower Extremity    Assessment  Patient is 71 y.o. male who presented acutely after having witnessed seizure x 2 at group home, found to have L sided hip fracture.  Patient now POD #1 L hip hemiarthroplasty.  PMH includes dementia.  Today patient limiting with mobility, often stating \"I can't do it\" when cued for mobility and yelling in pain.  Patient required max A for supine <> sit & demonstrated poor initiation.  He was able to stand and take a few steps forward and backward with FWW and mod-max A x 2 people.  Attempted to educate patient on posterior hip precautions, will require reinforcement.  Patient would benefit from ongoing acute PT and post acute placement to address deficits and maximize safety with functional mobility.   Plan    Recommend Physical Therapy 3 times per week until therapy goals are met for the following treatments:  Bed Mobility, Equipment, Gait Training, Neuro Re-Education / Balance, Self Care/Home Evaluation, Stair Training, Therapeutic Activities and Therapeutic Exercises    DC Equipment Recommendations: Unable to determine at this time  Discharge Recommendations: Recommend post-acute placement for additional physical therapy services prior to discharge home       Objective     01/28/22 0937   Prior Living Situation   Prior Services None   Equipment Owned None   Lives with - Patient's Self Care Capacity Unrelated Adult   Comments Unclear of pt's ability to give accurate PLOF.  Pt stated he lives in a shelter, shares a room \"downstairs\" with another person, reported has a FOS to get to room.    Prior Level of Functional Mobility   Comments Stated he was IND prior to admit   Cognition    Cognition / Consciousness X   Level of Consciousness Alert   New " "Learning Impaired   Initiation Impaired   Comments stated \"I can't do it\" when asked to mobilize.  Screaming in pain with any movement   Active ROM Lower Body    Active ROM Lower Body  WDL   Strength Lower Body   Lower Body Strength  WDL   Comments Unable to formally assess, WFL with standing and steps, no buckling   Sensation Lower Body   Lower Extremity Sensation   WDL   Balance Assessment   Sitting Balance (Static) Fair -   Sitting Balance (Dynamic) Poor +   Standing Balance (Static) Poor   Standing Balance (Dynamic) Poor -   Weight Shift Sitting Fair   Weight Shift Standing Poor   Comments w/ FWW   Gait Analysis   Gait Level Of Assist Moderate Assist (x 2 people)   Assistive Device Front Wheel Walker   Distance (Feet) 2   # of Times Distance was Traveled 2   Deviation Antalgic;Step To;Decreased Base Of Support;Decreased Heel Strike;Decreased Toe Off   # of Stairs Climbed 0   Weight Bearing Status WBAT LLE   Comments stated \"I can't do it\" when cued to take steps but able to take a few steps forward/backward with assistance   Bed Mobility    Supine to Sit Maximal Assist   Sit to Supine Maximal Assist   Scooting Maximal Assist   Rolling Maximal Assist to Rt.;Maximum Assist to Lt.   Comments HOB flat   Functional Mobility   Sit to Stand Maximal Assist   Bed, Chair, Wheelchair Transfer Maximal Assist   Transfer Method Stand Step   Mobility bed mobility, STS x 1, very short ambulation   Activity Tolerance   Sitting in Chair NT   Sitting Edge of Bed 8 min   Standing 3 min   Comments limited by pain   Short Term Goals    Short Term Goal # 1 Pt will perform supine <> sit without bed features with min A within 6 visits in order to progress toward PLOF   Short Term Goal # 2 Pt will perform STS/functional transfers with FWW and min A within 6 visits in order to progress OOB activity   Short Term Goal # 3 Pt will ambulate 25 ft with FWW and min A within 6 visits in order to progress toward PLOF   Session Information   Date " / Session Number  1/28 - 1 (1/3, 2/3)

## 2022-01-29 NOTE — PROGRESS NOTES
"   Orthopaedic Progress Note    Interval changes:  Patient doing well    Cleared for DC to SNF by ortho pending medicine clearance    ROS - Patient denies any new issues.  Pain well controlled.    /68   Pulse 82   Temp 36.3 °C (97.3 °F) (Temporal)   Resp 18   Ht 1.803 m (5' 11\")   Wt 68 kg (150 lb)   SpO2 100%       Patient seen and examined  No acute distress  Breathing non labored  RRR  LLE surgical dressing is clean, dry, and intact. Patient clearly fires tibialis anterior, EHL, and gastrocnemius/soleus. Sensation is intact to light touch throughout superficial peroneal, deep peroneal, tibial, saphenous, and sural nerve distributions. Strong and palpable 2+ dorsalis pedis and posterior tibial pulses with capillary refill less than 2 seconds. No lower leg tenderness or discomfort.       Recent Labs     01/26/22  1938 01/28/22  0200 01/29/22  0507   WBC 10.7 12.5* 9.8   RBC 3.01* 3.64* 3.20*   HEMOGLOBIN 9.5* 11.2* 9.8*   HEMATOCRIT 28.8* 35.0* 29.9*   MCV 95.7 96.2 93.4   MCH 31.6 30.8 30.6   MCHC 33.0* 32.0* 32.8*   RDW 51.1* 52.6* 52.3*   PLATELETCT 243 232 213   MPV 9.9 10.4 10.3       Active Hospital Problems    Diagnosis    • Leukocytosis [D72.829]    • S/p left hip fracture [Z87.81]    • Dementia ([facility]) [F03.90]    • Seizure ([facility]) [R56.9]    • Anemia [D64.9]    • Hyperglycemia [R73.9]        Assessment/Plan:  Patient doing well    Cleared for DC to SNF by ortho pending medicine clearance  POD#2 S/P Open treatment of left femoral fracture, proximal end, neck with prosthetic replacement  Wt bearing status - WBAT  Wound care/Drains - Mepilex ag dressing CDI  Future Procedures - none planned   Lovenox: Start 1/29, Duration-until ambulatory > 150'  Sutures/Staples out- 14 days post operatively  PT/OT-initiated  Antibiotics: perioperative completed   DVT Prophylaxis- TEDS/SCDs/Foot pumps  Vieira-none  Case Coordination for Discharge Planning - Disposition SNF    "

## 2022-01-29 NOTE — ASSESSMENT & PLAN NOTE
New findings from CTA chest   Started on lovenox. I will transition to xarelto tomorrow   Echo no right heart strain   1/30-start bridge with Coumadin

## 2022-01-30 LAB
ALBUMIN SERPL BCP-MCNC: 3.1 G/DL (ref 3.2–4.9)
BASOPHILS # BLD AUTO: 0.6 % (ref 0–1.8)
BASOPHILS # BLD: 0.05 K/UL (ref 0–0.12)
BUN SERPL-MCNC: 40 MG/DL (ref 8–22)
CALCIUM SERPL-MCNC: 8.3 MG/DL (ref 8.5–10.5)
CHLORIDE SERPL-SCNC: 105 MMOL/L (ref 96–112)
CO2 SERPL-SCNC: 18 MMOL/L (ref 20–33)
CREAT SERPL-MCNC: 1.77 MG/DL (ref 0.5–1.4)
EOSINOPHIL # BLD AUTO: 0.07 K/UL (ref 0–0.51)
EOSINOPHIL NFR BLD: 0.9 % (ref 0–6.9)
ERYTHROCYTE [DISTWIDTH] IN BLOOD BY AUTOMATED COUNT: 54.1 FL (ref 35.9–50)
ERYTHROCYTE [SEDIMENTATION RATE] IN BLOOD BY WESTERGREN METHOD: 122 MM/HOUR (ref 0–20)
GLUCOSE BLD-MCNC: 126 MG/DL (ref 65–99)
GLUCOSE BLD-MCNC: 96 MG/DL (ref 65–99)
GLUCOSE BLD-MCNC: 97 MG/DL (ref 65–99)
GLUCOSE SERPL-MCNC: 100 MG/DL (ref 65–99)
HCT VFR BLD AUTO: 29 % (ref 42–52)
HGB BLD-MCNC: 9.3 G/DL (ref 14–18)
IMM GRANULOCYTES # BLD AUTO: 0.05 K/UL (ref 0–0.11)
IMM GRANULOCYTES NFR BLD AUTO: 0.6 % (ref 0–0.9)
INR PPP: 1.21 (ref 0.87–1.13)
LYMPHOCYTES # BLD AUTO: 0.84 K/UL (ref 1–4.8)
LYMPHOCYTES NFR BLD: 10.6 % (ref 22–41)
MCH RBC QN AUTO: 31.2 PG (ref 27–33)
MCHC RBC AUTO-ENTMCNC: 32.1 G/DL (ref 33.7–35.3)
MCV RBC AUTO: 97.3 FL (ref 81.4–97.8)
MONOCYTES # BLD AUTO: 0.72 K/UL (ref 0–0.85)
MONOCYTES NFR BLD AUTO: 9.1 % (ref 0–13.4)
NEUTROPHILS # BLD AUTO: 6.2 K/UL (ref 1.82–7.42)
NEUTROPHILS NFR BLD: 78.2 % (ref 44–72)
NRBC # BLD AUTO: 0 K/UL
NRBC BLD-RTO: 0 /100 WBC
PHOSPHATE SERPL-MCNC: 3.6 MG/DL (ref 2.5–4.5)
PLATELET # BLD AUTO: 243 K/UL (ref 164–446)
PMV BLD AUTO: 10.3 FL (ref 9–12.9)
POTASSIUM SERPL-SCNC: 4.9 MMOL/L (ref 3.6–5.5)
PROTHROMBIN TIME: 15 SEC (ref 12–14.6)
RBC # BLD AUTO: 2.98 M/UL (ref 4.7–6.1)
SODIUM SERPL-SCNC: 135 MMOL/L (ref 135–145)
TSH SERPL DL<=0.005 MIU/L-ACNC: 1.85 UIU/ML (ref 0.38–5.33)
WBC # BLD AUTO: 7.9 K/UL (ref 4.8–10.8)

## 2022-01-30 PROCEDURE — 85610 PROTHROMBIN TIME: CPT

## 2022-01-30 PROCEDURE — A9270 NON-COVERED ITEM OR SERVICE: HCPCS | Performed by: ORTHOPAEDIC SURGERY

## 2022-01-30 PROCEDURE — A9270 NON-COVERED ITEM OR SERVICE: HCPCS | Performed by: INTERNAL MEDICINE

## 2022-01-30 PROCEDURE — 770001 HCHG ROOM/CARE - MED/SURG/GYN PRIV*

## 2022-01-30 PROCEDURE — 82962 GLUCOSE BLOOD TEST: CPT | Mod: 91

## 2022-01-30 PROCEDURE — 700102 HCHG RX REV CODE 250 W/ 637 OVERRIDE(OP): Performed by: INTERNAL MEDICINE

## 2022-01-30 PROCEDURE — 700111 HCHG RX REV CODE 636 W/ 250 OVERRIDE (IP): Performed by: INTERNAL MEDICINE

## 2022-01-30 PROCEDURE — 99024 POSTOP FOLLOW-UP VISIT: CPT | Performed by: ORTHOPAEDIC SURGERY

## 2022-01-30 PROCEDURE — 99232 SBSQ HOSP IP/OBS MODERATE 35: CPT | Performed by: INTERNAL MEDICINE

## 2022-01-30 PROCEDURE — 85652 RBC SED RATE AUTOMATED: CPT

## 2022-01-30 PROCEDURE — 85025 COMPLETE CBC W/AUTO DIFF WBC: CPT

## 2022-01-30 PROCEDURE — 700102 HCHG RX REV CODE 250 W/ 637 OVERRIDE(OP): Performed by: ORTHOPAEDIC SURGERY

## 2022-01-30 PROCEDURE — 36415 COLL VENOUS BLD VENIPUNCTURE: CPT

## 2022-01-30 PROCEDURE — 80069 RENAL FUNCTION PANEL: CPT

## 2022-01-30 PROCEDURE — 84443 ASSAY THYROID STIM HORMONE: CPT

## 2022-01-30 RX ORDER — WARFARIN SODIUM 7.5 MG/1
7.5 TABLET ORAL
Status: COMPLETED | OUTPATIENT
Start: 2022-01-30 | End: 2022-01-30

## 2022-01-30 RX ORDER — WARFARIN SODIUM 5 MG/1
5 TABLET ORAL DAILY
Status: DISCONTINUED | OUTPATIENT
Start: 2022-01-31 | End: 2022-01-31 | Stop reason: HOSPADM

## 2022-01-30 RX ADMIN — WARFARIN SODIUM 7.5 MG: 7.5 TABLET ORAL at 17:53

## 2022-01-30 RX ADMIN — OXYCODONE HYDROCHLORIDE 10 MG: 10 TABLET ORAL at 23:35

## 2022-01-30 RX ADMIN — ACETAMINOPHEN 650 MG: 325 TABLET, FILM COATED ORAL at 12:42

## 2022-01-30 RX ADMIN — ACETAMINOPHEN 650 MG: 325 TABLET, FILM COATED ORAL at 23:42

## 2022-01-30 RX ADMIN — OXYCODONE 5 MG: 5 TABLET ORAL at 09:58

## 2022-01-30 RX ADMIN — ENOXAPARIN SODIUM 60 MG: 60 INJECTION SUBCUTANEOUS at 06:52

## 2022-01-30 RX ADMIN — ACETAMINOPHEN 650 MG: 325 TABLET, FILM COATED ORAL at 01:14

## 2022-01-30 RX ADMIN — DOCUSATE SODIUM 100 MG: 100 CAPSULE, LIQUID FILLED ORAL at 06:52

## 2022-01-30 RX ADMIN — ACETAMINOPHEN 650 MG: 325 TABLET, FILM COATED ORAL at 17:52

## 2022-01-30 RX ADMIN — PHENYTOIN SODIUM 200 MG: 100 CAPSULE ORAL at 23:36

## 2022-01-30 RX ADMIN — ACETAMINOPHEN 650 MG: 325 TABLET, FILM COATED ORAL at 06:52

## 2022-01-30 ASSESSMENT — ENCOUNTER SYMPTOMS
SHORTNESS OF BREATH: 0
DIZZINESS: 0
SORE THROAT: 0
NAUSEA: 0
BRUISES/BLEEDS EASILY: 0
STRIDOR: 0
BLURRED VISION: 0
NECK PAIN: 0
INSOMNIA: 0
FEVER: 0
HEADACHES: 0
PALPITATIONS: 0
DEPRESSION: 0
MYALGIAS: 0
DOUBLE VISION: 0
VOMITING: 0
COUGH: 0
WEIGHT LOSS: 0

## 2022-01-30 ASSESSMENT — PAIN DESCRIPTION - PAIN TYPE
TYPE: ACUTE PAIN;SURGICAL PAIN
TYPE: ACUTE PAIN;SURGICAL PAIN

## 2022-01-30 NOTE — DISCHARGE PLANNING
Anticipated Discharge Disposition: IRF vs SNF    Action: LSW completed chart review. Per MD Nguyen's 1/29 progress note pt is medically clear for SNF or IRF. LSW made phone call to Jordana with [facility]. Jordana reported she will review the referral, however they won't have any beds until Monday. LSW made phone call to [facility] and left VM with callback request.    Barriers to Discharge: SNF bed vs IRF acceptance.    Plan: Care coordination will follow up with SNF and IRF for acceptance/bed.

## 2022-01-30 NOTE — PROGRESS NOTES
Received report and assumed care of patient at change of shift. Patient has bed alarm on which he keeps setting off due to changing positions in bed. Patient educated on importance of calling for assistance, but patient only uses call light by accident. Patient has complaints of pain with movement, but not when lying still in bed. Patient requests a deck of cards, but there aren't any in the gift shop. No other concerns at this time.

## 2022-01-30 NOTE — PROGRESS NOTES
"Inpatient Anticoagulation Service Note    Date: 1/30/2022    Reason for Anticoagulation: New Pulmonary Embolism   Target INR: 2.0 to 3.0     Hemoglobin Value: (!) 9.3  Hematocrit Value: (!) 29  Lab Platelet Value: 243    INR from last 7 days     Date/Time INR Value    01/30/22  1.21    01/26/22  1.13        Dose from last 7 days     Date/Time Dose (mg)    01/30/22 1800 7.5            HPI: 72 yo male admitted on 1/26/22 after reported seizure with GLF resulting in L displaced femoral neck fracture s/p open treatment with prosthetic replacement by ortho surgery on 1/27/22. CT chest 1/29/22 revealed bilateral acute PEs. New start warfarin with Lovenox bridge.   For acute VTE, CHEST guidelines recommend 5 days of overlap therapy with parenteral + warfarin and 2 consecutive INR readings within therapeutic range prior to discontinuation of parenteral.     Assessment: INR at baseline of 1.21. Today is day \"0\" of overlap Lovenox + Warfarin. Anticipate ~ 3 - 5 days of warfarin to achieve full anticoagulation. Hgb anemic but stable over 9 mg/dL, normal platelets. No major/overt bleeding reported.   • Potential drug-drug interactions identified with acute inpatient medications: No major DDIs identified.    • Potential drug-drug interactions identified with chronic home medications: Phenytoin with possibility to increase INR, however, warfarin dose will be customized to patient to achieve INR goal of 2 - 3.   • Inpatient Diet: Regular      Plan: Warfarin 7.5 mg po x one today followed by warfarin 5 mg po daily pending INR trend over the coming days. INR monitoring daily for now as further dose adjustments may be needed.     Education Material Provided?: No (Patient needs education prior to discharge)    Pharmacist suggested discharge dosing: Warfarin 5 mg po daily with plan for outpatient INR follow-up within 72 hours of discharge.      Lorraine Ahumada, PharmD, BCPS              "

## 2022-01-30 NOTE — CARE PLAN
The patient is Stable - Low risk of patient condition declining or worsening    Shift Goals  Clinical Goals: Pt will remain free from falls  Patient Goals: To get a deck of cards  Family Goals: n/a    Progress made toward(s) clinical / shift goals:  Patient has been free from falls during the shift and during ambulation to the bathroom.      Problem: Fall Risk  Goal: Patient will remain free from falls  Outcome: Progressing  Note: Patient out of bed with front wheel walker to restroom     Problem: Pain - Standard  Goal: Alleviation of pain or a reduction in pain to the patient’s comfort goal  Outcome: Progressing  Note: Patient does not complain of pain until he moves. Medicated per MAR

## 2022-01-30 NOTE — PROGRESS NOTES
"   Orthopaedic Progress Note    Interval changes:  Patient doing well   Cleared for DC to SNF by ortho pending medicine clearance    ROS - Patient denies any new issues.  Pain well controlled.    BP (!) 97/58   Pulse 91   Temp 36.3 °C (97.4 °F) (Temporal)   Resp 18   Ht 1.803 m (5' 11\")   Wt 68 kg (150 lb)   SpO2 99%       Patient seen and examined  No acute distress  Breathing non labored  RRR  LLE surgical dressing is clean, dry, and intact. Patient clearly fires tibialis anterior, EHL, and gastrocnemius/soleus. Sensation is intact to light touch throughout superficial peroneal, deep peroneal, tibial, saphenous, and sural nerve distributions. Strong and palpable 2+ dorsalis pedis and posterior tibial pulses with capillary refill less than 2 seconds. No lower leg tenderness or discomfort.       Recent Labs     01/26/22  1938 01/28/22  0200 01/29/22  0507   WBC 10.7 12.5* 9.8   RBC 3.01* 3.64* 3.20*   HEMOGLOBIN 9.5* 11.2* 9.8*   HEMATOCRIT 28.8* 35.0* 29.9*   MCV 95.7 96.2 93.4   MCH 31.6 30.8 30.6   MCHC 33.0* 32.0* 32.8*   RDW 51.1* 52.6* 52.3*   PLATELETCT 243 232 213   MPV 9.9 10.4 10.3       Active Hospital Problems    Diagnosis    • Acute pulmonary embolism (HCC) [I26.99]    • Lymph nodes enlarged [R59.9]    • Acute kidney injury superimposed on chronic kidney disease (HCC) [N17.9, N18.9]    • Leukocytosis [D72.829]    • S/p left hip fracture [Z87.81]    • Dementia (HCC) [F03.90]    • Seizure (HCC) [R56.9]    • Anemia [D64.9]    • Hyperglycemia [R73.9]        Assessment/Plan:  Patient doing well    Cleared for DC to SNF by ortho pending medicine clearance  POD#2 S/P Open treatment of left femoral fracture, proximal end, neck with prosthetic replacement  Wt bearing status - WBAT  Wound care/Drains - mepilex ag dressings to be left in place  Future Procedures - none planned   Lovenox: Start 1/29, Duration-until ambulatory > 150'  Sutures/Staples out- 14 days post operatively  PT/OT-initiated  Antibiotics: " perioperative completed   DVT Prophylaxis- TEDS/SCDs/Foot pumps  Vieira-none  Case Coordination for Discharge Planning - Disposition SNF      I have performed a physical exam and reviewed and updated ROS and Plan today (1/29). In review of yesterday's note (1/28), there are no changes except as documented above.

## 2022-01-30 NOTE — CARE PLAN
The patient is Stable - Low risk of patient condition declining or worsening    Shift Goals  Clinical Goals: Maintain stable blood pressure   Patient Goals: rest  Family Goals: n/a    Progress made toward(s) clinical / shift goals:    Problem: Fall Risk  Goal: Patient will remain free from falls  Outcome: Progressing  Note: Safety precautions are in place including bed locked and in lowest position, upper bed rails up, bed alarm on, call light within reach, treaded socks on, tray table and personal belongings within reach.        Patient is not progressing towards the following goals:

## 2022-01-30 NOTE — PROGRESS NOTES
[facility] Medicine Daily Progress Note    Date of Service  1/30/2022    Chief Complaint  Zeyad Horan is a 71 y.o. male admitted 1/26/2022 with seizure, fall and hip fracture     Hospital Course  This is a 70 y/o M with PMHX of dementia, seizures, who was admitted on 1/26/22 after presenting to ER complaining of left hip after a fall. Patient had witnessed seizure episode at group home where he lives and fell down. He was having hip pain after it.  Here in ER he was found to have  Left hip fracture, ortho consulted and patient admitted for further treatment.    Interval Problem Update  Patient seen and examined, afebrile, resting in bed, no nausea or vomiting, complains of left hip pain. Ortho consulted and plan for surgical intervention today.  I have also discussed case with neurology regarding his seizure episode and if his seizure medication needs to change, but his dilantin levels are undetectable so unsure if he was getting his dilantin and as per neurology can continue on dilantin     1/28- no event. Denies pain, he tells me that he had seizures.  Hypotensive last night but improving during the day.  Placement per PT/OT    1/29-patient again hypotensive later in the evening and tachycardic.  On-call physician did order D-dimers and subsequently CTA chest because of D-dimer significantly elevated.  CTA showed acute bilateral pulmonary emboli, left more than right.  Echocardiogram also ordered to confirm no right heart strain.    1/30- no event. Tried to get out of his bed on his own. he did not fall, but he had trouble to get in the bed.emphasized to comply with instruction and patient verbalized understanding.  Since pt is dilantin only option for PE management is Coumadin.  Pharmacy is consulted.  Patient will continue Lovenox bridge with Coumadin.  Medically cleared to transfer to skilled nursing at this point    I have personally seen and examined the patient at bedside. I discussed the plan of care with  patient and bedside RN.    Consultants/Specialty  neurology and orthopedics    Code Status  Full Code    Disposition  Patient is medically cleared for discharge.   Anticipate discharge to to skilled nursing facility.  I have placed the appropriate orders for post-discharge needs.    Review of Systems  Review of Systems   Constitutional: Negative for fever, malaise/fatigue and weight loss.   HENT: Negative for sore throat and tinnitus.    Eyes: Negative for blurred vision and double vision.   Respiratory: Negative for cough, shortness of breath and stridor.    Cardiovascular: Negative for chest pain and palpitations.   Gastrointestinal: Negative for nausea and vomiting.   Genitourinary: Negative for dysuria and urgency.   Musculoskeletal: Positive for joint pain. Negative for myalgias and neck pain.   Skin: Negative for itching and rash.   Neurological: Negative for dizziness and headaches.   Endo/Heme/Allergies: Does not bruise/bleed easily.   Psychiatric/Behavioral: Negative for depression. The patient does not have insomnia.    All other systems reviewed and are negative.       Physical Exam  Temp:  [36.3 °C (97.4 °F)-37.2 °C (98.9 °F)] 37.2 °C (98.9 °F)  Pulse:  [] 89  Resp:  [18] 18  BP: ()/(56-69) 113/69  SpO2:  [94 %-99 %] 95 %    Physical Exam  Vitals and nursing note reviewed.   Constitutional:       General: He is in acute distress.      Appearance: Normal appearance. He is normal weight. He is not toxic-appearing.   HENT:      Head: Normocephalic and atraumatic.      Nose: Nose normal. No congestion or rhinorrhea.      Mouth/Throat:      Mouth: Mucous membranes are moist.      Pharynx: Oropharynx is clear.   Eyes:      General: No scleral icterus.     Conjunctiva/sclera: Conjunctivae normal.   Neck:      Vascular: No carotid bruit.   Cardiovascular:      Rate and Rhythm: Normal rate and regular rhythm.      Pulses: Normal pulses.      Heart sounds: Normal heart sounds. No murmur heard.  No  gallop.    Pulmonary:      Effort: No respiratory distress.      Breath sounds: Normal breath sounds. No wheezing or rales.   Abdominal:      General: Abdomen is flat. Bowel sounds are normal. There is no distension.      Palpations: Abdomen is soft.      Tenderness: There is no abdominal tenderness. There is no guarding.   Musculoskeletal:         General: Swelling and tenderness present. No signs of injury.      Cervical back: Normal range of motion and neck supple. No muscular tenderness.      Comments: Left lower extremity range of motion limited by pain   Lymphadenopathy:      Cervical: No cervical adenopathy.   Skin:     Capillary Refill: Capillary refill takes less than 2 seconds.      Coloration: Skin is not jaundiced or pale.      Findings: No bruising.   Neurological:      General: No focal deficit present.      Mental Status: He is alert. Mental status is at baseline.      Cranial Nerves: No cranial nerve deficit.      Motor: No weakness.      Coordination: Coordination normal.   Psychiatric:         Mood and Affect: Mood normal.         Thought Content: Thought content normal.         Judgment: Judgment normal.         Fluids    Intake/Output Summary (Last 24 hours) at 1/30/2022 1404  Last data filed at 1/30/2022 0900  Gross per 24 hour   Intake 240 ml   Output 200 ml   Net 40 ml       Laboratory  Recent Labs     01/28/22  0200 01/29/22  0507 01/30/22  1002   WBC 12.5* 9.8 7.9   RBC 3.64* 3.20* 2.98*   HEMOGLOBIN 11.2* 9.8* 9.3*   HEMATOCRIT 35.0* 29.9* 29.0*   MCV 96.2 93.4 97.3   MCH 30.8 30.6 31.2   MCHC 32.0* 32.8* 32.1*   RDW 52.6* 52.3* 54.1*   PLATELETCT 232 213 243   MPV 10.4 10.3 10.3     Recent Labs     01/28/22  0200 01/29/22  0507 01/30/22  0627   SODIUM 133* 132* 135   POTASSIUM 4.7 5.1 4.9   CHLORIDE 100 102 105   CO2 22 19* 18*   GLUCOSE 179* 98 100*   BUN 25* 40* 40*   CREATININE 1.32 1.99* 1.77*   CALCIUM 8.7 8.4* 8.3*     Recent Labs     01/30/22  1002   INR 1.21*                Imaging  EC-ECHOCARDIOGRAM COMPLETE W/O CONT   Final Result      CT-CTA CHEST PULMONARY ARTERY W/ RECONS   Final Result         1. Acute bilateral pulmonary emboli, left more than right.      2. No elevation of the RV/LV ratio.      3. Patchy bibasilar atelectasis. Trace pericardial effusion.      4. Enlarged subcarinal lymph node, measuring 2.1 x 4.0 cm., of unclear etiology.      Preliminary findings texted to Dr. YEMI LEONE via [employer] on 1/29/2022 6:59 AM      DX-PELVIS-1 OR 2 VIEWS   Final Result      Postoperative changes left hip hemiarthroplasty without immediate postoperative hardware complication.      CT-PELVIS W/O PLUS RECONS   Final Result      1.  Severely comminuted, displaced and angulated fracture of the LEFT femoral neck.   2.  No pelvic fracture.   3.  Diffuse osteopenia.   4.  Markedly distended bladder.      CT-HEAD W/O   Final Result      1.  Diffuse atrophy and white matter changes.   2.  No acute intracranial hemorrhage or territorial infarct.   3.  Frontal encephalomalacia again seen.   4.  Chronic LEFT occipital infarct.              Assessment/Plan  * S/p left hip fracture- (present on admission)  Assessment & Plan  Subsequent to seizure.  S/p Open treatment of left femoral fracture, proximal end, neck with prosthetic replacement 1/27/2022  Pain management   Needs placement     Acute kidney injury superimposed on chronic kidney disease (HCC)  Assessment & Plan  Creatinine baseline 1.01-1.18  Cr 1/29- 1.99, GFR 33. Continue to monitor. Pt did have contrast exposure 1/28 .   1/30-slight improvement.  Continue to monitor    Lymph nodes enlarged- (present on admission)  Assessment & Plan  Incidental finding of enlarged carinal lymphoid nodules  Patient is a smoker  Needs follow-up with primary care, consider biopsy if persistent in 3 months    Acute pulmonary embolism (HCC)- (present on admission)  Assessment & Plan  New findings from CTA chest   Started on lovenox. I will transition  to xarelto tomorrow   Echo no right heart strain   1/30-start bridge with Coumadin    Leukocytosis- (present on admission)  Assessment & Plan  Reactive  Continue to monitor    Hyperglycemia- (present on admission)  Assessment & Plan   regular insulin sliding scale before every meal as needed  A1c 5.9     Anemia- (present on admission)  Assessment & Plan  Uncertain cause, check iron panel  Monitor , transfuse if below 7   1/28- stable.  Anemia of chronic disease.  No active bleeding.      Seizure (HCC)- (present on admission)  Assessment & Plan  Patient lives in a group and as per him he has taken all his medication they give him.  neurology was consulted   Dilantin level are almost undetectable. Per neurology cont with dilantin   No changes on his seizure medication        Dementia (HCC)- (present on admission)  Assessment & Plan  In no acute distress, supportive care       VTE prophylaxis: heparin ppx    I have performed a physical exam and reviewed and updated ROS and Plan today (1/30/2022). In review of yesterday's note (1/29/2022), there are no changes except as documented above.

## 2022-01-31 VITALS
OXYGEN SATURATION: 95 % | TEMPERATURE: 98 F | WEIGHT: 150 LBS | DIASTOLIC BLOOD PRESSURE: 58 MMHG | HEIGHT: 71 IN | RESPIRATION RATE: 17 BRPM | BODY MASS INDEX: 21 KG/M2 | SYSTOLIC BLOOD PRESSURE: 108 MMHG | HEART RATE: 88 BPM

## 2022-01-31 LAB
GLUCOSE BLD-MCNC: 140 MG/DL (ref 65–99)
INR PPP: 1.26 (ref 0.87–1.13)
PROTHROMBIN TIME: 15.5 SEC (ref 12–14.6)
SARS-COV+SARS-COV-2 AG RESP QL IA.RAPID: NOTDETECTED
SPECIMEN SOURCE: NORMAL

## 2022-01-31 PROCEDURE — 99024 POSTOP FOLLOW-UP VISIT: CPT | Performed by: ORTHOPAEDIC SURGERY

## 2022-01-31 PROCEDURE — A9270 NON-COVERED ITEM OR SERVICE: HCPCS | Performed by: ORTHOPAEDIC SURGERY

## 2022-01-31 PROCEDURE — 700111 HCHG RX REV CODE 636 W/ 250 OVERRIDE (IP): Performed by: INTERNAL MEDICINE

## 2022-01-31 PROCEDURE — 99239 HOSP IP/OBS DSCHRG MGMT >30: CPT | Performed by: INTERNAL MEDICINE

## 2022-01-31 PROCEDURE — 700102 HCHG RX REV CODE 250 W/ 637 OVERRIDE(OP): Performed by: ORTHOPAEDIC SURGERY

## 2022-01-31 PROCEDURE — 36415 COLL VENOUS BLD VENIPUNCTURE: CPT

## 2022-01-31 PROCEDURE — 87426 SARSCOV CORONAVIRUS AG IA: CPT

## 2022-01-31 PROCEDURE — 85610 PROTHROMBIN TIME: CPT

## 2022-01-31 RX ORDER — OXYCODONE HYDROCHLORIDE 5 MG/1
5 TABLET ORAL EVERY 6 HOURS PRN
Qty: 12 TABLET | Refills: 0 | Status: SHIPPED | OUTPATIENT
Start: 2022-01-31 | End: 2022-02-03

## 2022-01-31 RX ORDER — WARFARIN SODIUM 5 MG/1
5 TABLET ORAL DAILY
Qty: 30 TABLET | Refills: 3 | Status: SHIPPED
Start: 2022-01-31

## 2022-01-31 RX ADMIN — ACETAMINOPHEN 650 MG: 325 TABLET, FILM COATED ORAL at 13:23

## 2022-01-31 RX ADMIN — DOCUSATE SODIUM 100 MG: 100 CAPSULE, LIQUID FILLED ORAL at 06:08

## 2022-01-31 RX ADMIN — ACETAMINOPHEN 650 MG: 325 TABLET, FILM COATED ORAL at 06:08

## 2022-01-31 RX ADMIN — ENOXAPARIN SODIUM 60 MG: 60 INJECTION SUBCUTANEOUS at 06:05

## 2022-01-31 ASSESSMENT — PAIN DESCRIPTION - PAIN TYPE: TYPE: SURGICAL PAIN

## 2022-01-31 NOTE — PROGRESS NOTES
Inpatient Anticoagulation Service Note    Date: 1/31/2022    Reason for Anticoagulation: New Pulmonary Embolism   Target INR: 2.0 to 3.0         Hemoglobin Value: (!) 9.3  Hematocrit Value: (!) 29  Lab Platelet Value: 243    INR from last 7 days     Date/Time INR Value    01/31/22 0559 1.26    01/30/22 10:02:01 1.21    01/26/22 19:38:01 1.13        Dose from last 7 days     Date/Time Dose (mg)    01/31/22 1251 5    01/30/22 1237 7.5        Significant Interactions: Phenytoin  Bridge Therapy: Yes  Date of Last VTE Event: 01/29/22  Bridge Therapy Start Date: 01/30/22  Days of Overlap Therapy: 1   INR Value Greater than 2 Prior to Discontinuation of Parenteral Anticoagulation: Not Applicable     Reversal Agent Administered: Not Applicable  Comments: INR remains sub-therapeutic as expected secondary to warfarin just starting yesterday. Patient remains on Lovenox bridge. Patient counselled on DDI, food/drug interaction, s/sx of bleeding and thrombosis and provided with education pamphlet. Will give warfarin 5 mg PO and repeat INR in AM.    Plan:  Warfarin 5 mg PO, repeat INR in AM  Education Material Provided?: Yes (Provided by [facility] on 1/31/22)  Pharmacist suggested discharge dosing: TBD pending INR trends.     Melany Vasquez, BradyD

## 2022-01-31 NOTE — PROGRESS NOTES
"Patient seen and examined  Complains of pain as expected, no complaints    /66   Pulse 84   Temp 36.7 °C (98 °F) (Temporal)   Resp 18   Ht 1.803 m (5' 11\")   Wt 68 kg (150 lb)   SpO2 95%     Recent Labs     01/29/22  0507 01/30/22  1002   WBC 9.8 7.9   RBC 3.20* 2.98*   HEMOGLOBIN 9.8* 9.3*   HEMATOCRIT 29.9* 29.0*   MCV 93.4 97.3   MCH 30.6 31.2   MCHC 32.8* 32.1*   RDW 52.3* 54.1*   PLATELETCT 213 243   MPV 10.3 10.3       No acute distress  Dressing clean dry and intact  Neurovascularly intact    POD# 4 hip hemiarthroplasty              Plan:  DVT Prophylaxis- TEDS/SCDs, LMWh in house, ASA BID outpt`  Weight Bearing Status-WBAT, posterior hip precautions  PT/OT  Antibiotics: perioperative abx complete  Case Coordination          "

## 2022-01-31 NOTE — DISCHARGE SUMMARY
Discharge Summary    CHIEF COMPLAINT ON ADMISSION  Chief Complaint   Patient presents with   • T-5000 FALL   • Seizure     x 2, witnessed, 5 mintues each   • Hip Injury     L leg outward rotation and shortening       Reason for Admission  TBI     CODE STATUS  Full Code    HPI & HOSPITAL COURSE  70 y/o M with PMHX of dementia, seizures, who was admitted on 1/26/22 after presenting to ER complaining of left hip after a fall. Patient had witnessed seizure episode at group home where he lives and fell down. He was having hip pain after it.  Here in ER he was found to have  Left hip fracture.  He was taken to the OR for open treatment of left femoral fracture, proximal end, neck with prosthetic replacement by Dr. Kirkland 1/27/2022.  Postop care was continued.  On arrival Dilantin levels were undetectable.  Neurology was consulted and recommended to restart Dilantin.  Post surgery patient continued to be hypotensive, slightly tachycardic.  He did not respond to IV fluid.  D-dimers were done and significantly elevated.  Subsequently CT chest was performed and showed bilateral pulmonary emboli left more than the right.  Echocardiogram also confirmed no right heart strains.  Because patient is Dilantin he is not a candidate for newer anticoagulants.  He was started on Lovenox and Coumadin bridge.  Also during this hospital stay after CTA his creatinine dropped slightly due to hypoperfusion and contrast exposure.  Lisinopril was held.  Patient will need to continue monitoring and resume lisinopril if blood pressure continues to hypertensive.  Also during this admission incidental findings noted enlarged carinal lymph nodules.  He is a smoker.  He is warranted a repeat CT scan in 3 months also.  Echocardiogram showed echolucent material in the right ventricle.  ESR is elevated at the can be false positive due to recent surgery and PE.  Consider repeat echocardiogram in 3 months.  Otherwise patient is back to his  baseline.    Therefore, he is discharged in guarded and stable condition to skilled nursing facility.    The patient met 2-midnight criteria for an inpatient stay at the time of discharge.      FOLLOW UP ITEMS POST DISCHARGE  Follow-up with orthopedic in 2 weeks    DISCHARGE DIAGNOSES  Principal Problem:    S/p left hip fracture POA: Yes  Active Problems:    Dementia (HCC) POA: Yes    Seizure (HCC) POA: Yes    Anemia POA: Yes    Hyperglycemia POA: Yes    Leukocytosis POA: Yes    Acute pulmonary embolism (HCC) POA: Yes    Lymph nodes enlarged POA: Yes    Acute kidney injury superimposed on chronic kidney disease (HCC) POA: No  Resolved Problems:    * No resolved hospital problems. *      FOLLOW UP  No future appointments.  No follow-up provider specified.    MEDICATIONS ON DISCHARGE     Medication List      START taking these medications      Instructions   enoxaparin 60 MG/0.6ML Soln inj  Commonly known as: LOVENOX   Inject 60 mg under the skin every 12 hours for 5 days.  Dose: 1 mg/kg     oxyCODONE immediate-release 5 MG Tabs  Commonly known as: ROXICODONE   Take 1 Tablet by mouth every 6 hours as needed for up to 3 days.  Dose: 5 mg     warfarin 5 MG Tabs  Commonly known as: COUMADIN   Take 1 Tablet by mouth every day at 6 PM.  Dose: 5 mg        CONTINUE taking these medications      Instructions   phenytoin  MG Caps  Commonly known as: DILANTIN   Take 200 mg by mouth at bedtime.  Dose: 200 mg     simvastatin 5 MG Tabs  Commonly known as: ZOCOR   Take 5 mg by mouth every evening.  Dose: 5 mg        STOP taking these medications    lisinopril 5 MG Tabs  Commonly known as: PRINIVIL            Allergies  Allergies   Allergen Reactions   • Nkda [No Known Drug Allergy]        DIET  Orders Placed This Encounter   Procedures   • Diet Order Diet: Regular     Standing Status:   Standing     Number of Occurrences:   1     Order Specific Question:   Diet:     Answer:   Regular [1]       ACTIVITY  As tolerated and  directed by skilled nursing.  Weight bearing as tolerated    LINES, DRAINS, AND WOUNDS  This is an automated list. Peripheral IVs will be removed prior to discharge.  Peripheral IV 01/27/22 20 G (Active)   Site Assessment Clean;Dry;Intact 01/30/22 2100   Dressing Type Transparent 01/30/22 2100   Line Status Scrubbed the hub prior to access;Flushed;Saline locked 01/30/22 2100   Dressing Status Clean;Dry;Intact 01/30/22 2100   Dressing Intervention N/A 01/30/22 2100   Date Primary Tubing Changed 01/29/22 01/29/22 2100   Date Secondary Tubing Changed 02/02/22 01/29/22 2100   Infiltration Grading ([facility] 0 01/30/22 2100   Phlebitis Scale ([facility] Only) 0 01/30/22 2100       Peripheral IV 01/27/22 18 G Left Upper arm (Active)   Site Assessment Clean;Dry;Intact 01/30/22 2100   Dressing Type Transparent 01/30/22 2100   Line Status Scrubbed the hub prior to access;Flushed;Blood return noted;Saline locked 01/30/22 2100   Dressing Status Clean;Dry;Intact 01/30/22 2100   Dressing Intervention N/A 01/30/22 2100   Date Primary Tubing Changed 01/29/22 01/29/22 2100   NEXT Primary Tubing Change  02/02/22 01/29/22 2100   Infiltration Grading ([facility] 0 01/30/22 2100   Phlebitis Scale ([facility] Only) 0 01/30/22 2100       Wound 01/27/22 Incision Hip Left aquacel (Active)   Site Assessment GINNY 01/30/22 2100   Periwound Assessment GINNY 01/30/22 2100   Margins GINNY 01/30/22 2100   Closure GINNY 01/30/22 2100   Drainage Amount None 01/30/22 2100   Drainage Description Serosanguineous 01/29/22 2100   Treatments Ice applied 01/28/22 2000   Dressing Options Other (Comments) 01/27/22 1830   Dressing Changed Observed 01/30/22 2100   Dressing Status Clean;Dry;Intact 01/30/22 2100       Peripheral IV 01/27/22 20 G (Active)   Site Assessment Clean;Dry;Intact 01/30/22 2100   Dressing Type Transparent 01/30/22 2100   Line Status Scrubbed the hub prior to access;Flushed;Saline locked 01/30/22 2100   Dressing Status Clean;Dry;Intact 01/30/22  2100   Dressing Intervention N/A 01/30/22 2100   Date Primary Tubing Changed 01/29/22 01/29/22 2100   Date Secondary Tubing Changed 02/02/22 01/29/22 2100   Infiltration Grading ([facility], [facility]) 0 01/30/22 2100   Phlebitis Scale ([facility] Only) 0 01/30/22 2100       Peripheral IV 01/27/22 18 G Left Upper arm (Active)   Site Assessment Clean;Dry;Intact 01/30/22 2100   Dressing Type Transparent 01/30/22 2100   Line Status Scrubbed the hub prior to access;Flushed;Blood return noted;Saline locked 01/30/22 2100   Dressing Status Clean;Dry;Intact 01/30/22 2100   Dressing Intervention N/A 01/30/22 2100   Date Primary Tubing Changed 01/29/22 01/29/22 2100   NEXT Primary Tubing Change  02/02/22 01/29/22 2100   Infiltration Grading ([facility], [facility]) 0 01/30/22 2100   Phlebitis Scale ([facility] Only) 0 01/30/22 2100               MENTAL STATUS ON TRANSFER     Alert and oriented x4, however patient has poor understanding and is very impulsive       CONSULTATIONS  Orthopedic surgeon  Neurology    PROCEDURES  Open treatment of left femoral fracture, proximal end, neck with prosthetic replacement    LABORATORY  Lab Results   Component Value Date    SODIUM 135 01/30/2022    POTASSIUM 4.9 01/30/2022    CHLORIDE 105 01/30/2022    CO2 18 (L) 01/30/2022    GLUCOSE 100 (H) 01/30/2022    BUN 40 (H) 01/30/2022    CREATININE 1.77 (H) 01/30/2022    CREATININE 0.8 04/02/2008        Lab Results   Component Value Date    WBC 7.9 01/30/2022    HEMOGLOBIN 9.3 (L) 01/30/2022    HEMATOCRIT 29.0 (L) 01/30/2022    PLATELETCT 243 01/30/2022        Total time of the discharge process exceeds 45 minutes.

## 2022-01-31 NOTE — DISCHARGE PLANNING
Agency/Facility Name: [facility]  Spoke To: Consuelo  Outcome: Has a bed available today.  Does not have any transport times left for today.    RN CM notified via Teams.

## 2022-01-31 NOTE — PROGRESS NOTES
"   Orthopaedic Progress Note    Interval changes:  Patient doing well   Cleared for DC to SNF by ortho pending medicine clearance    ROS - Patient denies any new issues.  Pain well controlled.    BP (!) 95/52   Pulse 83   Temp 37.1 °C (98.8 °F) (Temporal)   Resp 18   Ht 1.803 m (5' 11\")   Wt 68 kg (150 lb)   SpO2 91%       Patient seen and examined  No acute distress  Breathing non labored  RRR  LLE surgical dressing is clean, dry, and intact. Patient clearly fires tibialis anterior, EHL, and gastrocnemius/soleus. Sensation is intact to light touch throughout superficial peroneal, deep peroneal, tibial, saphenous, and sural nerve distributions. Strong and palpable 2+ dorsalis pedis and posterior tibial pulses with capillary refill less than 2 seconds. No lower leg tenderness or discomfort.       Recent Labs     01/28/22  0200 01/29/22  0507 01/30/22  1002   WBC 12.5* 9.8 7.9   RBC 3.64* 3.20* 2.98*   HEMOGLOBIN 11.2* 9.8* 9.3*   HEMATOCRIT 35.0* 29.9* 29.0*   MCV 96.2 93.4 97.3   MCH 30.8 30.6 31.2   MCHC 32.0* 32.8* 32.1*   RDW 52.6* 52.3* 54.1*   PLATELETCT 232 213 243   MPV 10.4 10.3 10.3       Active Hospital Problems    Diagnosis    • Acute pulmonary embolism (HCC) [I26.99]    • Lymph nodes enlarged [R59.9]    • Acute kidney injury superimposed on chronic kidney disease (HCC) [N17.9, N18.9]    • Leukocytosis [D72.829]    • S/p left hip fracture [Z87.81]    • Dementia (HCC) [F03.90]    • Seizure (HCC) [R56.9]    • Anemia [D64.9]    • Hyperglycemia [R73.9]        Assessment/Plan:  Patient doing well    Cleared for DC to SNF by ortho pending medicine clearance  POD#3 S/P Open treatment of left femoral fracture, proximal end, neck with prosthetic replacement  Wt bearing status - WBAT  Wound care/Drains - mepilex ag dressings to be left in place  Future Procedures - none planned   Lovenox: Start 1/29, Duration-until ambulatory > 150'  Sutures/Staples out- 14 days post operatively  PT/OT-initiated  Antibiotics: " perioperative completed   DVT Prophylaxis- TEDS/SCDs/Foot pumps  Vieira-none  Case Coordination for Discharge Planning - Disposition SNF      I have performed a physical exam and reviewed and updated ROS and Plan today (1/30). In review of yesterday's note (1/29), there are no changes except as documented above.

## 2022-01-31 NOTE — DISCHARGE PLANNING
Agency/Facility Name: [facility]  Spoke To: Consuelo  Outcome: Consuelo is aware of the 1630 transport time with [facility].

## 2022-01-31 NOTE — DISCHARGE PLANNING
Agency/Facility Name: [facility]  Outcome: Left a message for admissions.  Awaiting a call back.    Agency/Facility Name: [facility]  Spoke To: Mauro  Outcome: No beds until Wed or Thurs.    Agency/Facility Name: [facility]  Spoke To: Consuelo  Outcome: Checking on bed availability and will all this DPA back.

## 2022-01-31 NOTE — CARE PLAN
Problem: Fall Risk  Goal: Patient will remain free from falls  Outcome: Progressing     Problem: Knowledge Deficit - Standard  Goal: Patient and family/care givers will demonstrate understanding of plan of care, disease process/condition, diagnostic tests and medications  Outcome: Progressing     Problem: Pain - Standard  Goal: Alleviation of pain or a reduction in pain to the patient’s comfort goal  Outcome: Progressing   The patient is Stable - Low risk of patient condition declining or worsening    Shift Goals  Clinical Goals: safety  Patient Goals: food, rest  Family Goals: n/a    Progress made toward(s) clinical / shift goals:  Educate patient of available PRN pain medication per MAR. Reinforce fall/safety precautions.     Patient is not progressing towards the following goals:

## 2022-01-31 NOTE — DISCHARGE PLANNING
CM placed call to [facility] home - [facility] [phone] and they will not have a bed until at least Wednesday. CM notified attending of no bed available.

## 2022-01-31 NOTE — DISCHARGE PLANNING
DC Transport Scheduled    Received request at: 1323    Transport Company Scheduled:  [facility]      Scheduled Date: 01/31/22  Scheduled Time: 1630    Destination: ([facility] [address] [place]    Notified care team of scheduled transport via Voalte.     If there are any changes needed to the DC transportation scheduled, please contact [facility] at ext. [phone] between the hours of 0466-7885 Mon-Fri. If outside those hours, contact the ED Case Manager at ext. [phone].

## 2022-02-01 NOTE — PROGRESS NOTES
"   Orthopaedic Progress Note    Interval changes:  Patient doing well   Cleared for DC to SNF by ortho pending medicine clearance    ROS - Patient denies any new issues.  Pain well controlled.    /58   Pulse 88   Temp 36.7 °C (98 °F) (Temporal)   Resp 17   Ht 1.803 m (5' 11\")   Wt 68 kg (150 lb)   SpO2 95%       Patient seen and examined  No acute distress  Breathing non labored  RRR  LLE surgical dressing is clean, dry, and intact. Patient clearly fires tibialis anterior, EHL, and gastrocnemius/soleus. Sensation is intact to light touch throughout superficial peroneal, deep peroneal, tibial, saphenous, and sural nerve distributions. Strong and palpable 2+ dorsalis pedis and posterior tibial pulses with capillary refill less than 2 seconds. No lower leg tenderness or discomfort.       Recent Labs     01/29/22  0507 01/30/22  1002   WBC 9.8 7.9   RBC 3.20* 2.98*   HEMOGLOBIN 9.8* 9.3*   HEMATOCRIT 29.9* 29.0*   MCV 93.4 97.3   MCH 30.6 31.2   MCHC 32.8* 32.1*   RDW 52.3* 54.1*   PLATELETCT 213 243   MPV 10.3 10.3       Active Hospital Problems    Diagnosis    • Acute pulmonary embolism (HCC) [I26.99]    • Lymph nodes enlarged [R59.9]    • Acute kidney injury superimposed on chronic kidney disease (HCC) [N17.9, N18.9]    • Leukocytosis [D72.829]    • S/p left hip fracture [Z87.81]    • Dementia (HCC) [F03.90]    • Seizure (HCC) [R56.9]    • Anemia [D64.9]    • Hyperglycemia [R73.9]        Assessment/Plan:  Patient doing well    Cleared for DC to SNF by ortho pending medicine clearance  POD#4 S/P Open treatment of left femoral fracture, proximal end, neck with prosthetic replacement  Wt bearing status - WBAT  Wound care/Drains - mepilex ag dressings to be left in place  Future Procedures - none planned   Lovenox: Start 1/29, Duration-until ambulatory > 150'  Sutures/Staples out- 14 days post operatively  PT/OT-initiated  Antibiotics: perioperative completed   DVT Prophylaxis- TEDS/SCDs/Foot " pumps  Vieira-none  Case Coordination for Discharge Planning - Disposition SNF      I have performed a physical exam and reviewed and updated ROS and Plan today (1/31). In review of yesterday's note (1/30), there are no changes except as documented above.

## 2022-02-01 NOTE — PROGRESS NOTES
Report called to [facility], taken by Olivia PATTON. Pt d/c'd via [facility] medical transport to [facility] in [place]. Pt sent with all personal belongings.

## 2022-02-01 NOTE — CARE PLAN
Problem: Fall Risk  Goal: Patient will remain free from falls  Outcome: Progressing     Problem: Pain - Standard  Goal: Alleviation of pain or a reduction in pain to the patient’s comfort goal  Outcome: Progressing     Problem: Skin Integrity  Goal: Skin integrity is maintained or improved  Outcome: Progressing   The patient is Stable - Low risk of patient condition declining or worsening    Shift Goals  Clinical Goals: pain mgmt, safety  Patient Goals: rest  Family Goals: n/a    Progress made toward(s) clinical / shift goals:      Patient is not progressing towards the following goals:

## 2022-02-01 NOTE — DISCHARGE INSTRUCTIONS
Discharge Instructions    Discharged to other by medical transportation with self. Discharged via wheelchair, hospital escort: Yes.  Special equipment needed: Not Applicable    Be sure to schedule a follow-up appointment with your primary care doctor or any specialists as instructed.     Discharge Plan:   Diet Plan: Discussed  Activity Level: Discussed  Confirmed Follow up Appointment: Patient to Call and Schedule Appointment  Confirmed Symptoms Management: Discussed  Influenza Vaccine Indication: Indicated: 65 years and older    I understand that a diet low in cholesterol, fat, and sodium is recommended for good health. Unless I have been given specific instructions below for another diet, I accept this instruction as my diet prescription.   Other diet: Regular    Special Instructions:  None    · Is patient discharged on Warfarin / Coumadin?   Yes    You are receiving the drug warfarin. Please understand the importance of monitoring warfarin with scheduled PT/INR blood draws.  Follow-up with a call to your personal Doctor's office in 3 days to schedule a PT/INR. .    IMPORTANT: HOW TO USE THIS INFORMATION:  This is a summary and does NOT have all possible information about this product. This information does not assure that this product is safe, effective, or appropriate for you. This information is not individual medical advice and does not substitute for the advice of your health care professional. Always ask your health care professional for complete information about this product and your specific health needs.      WARFARIN - ORAL (WARF-uh-rin)      COMMON BRAND NAME(S): Coumadin      WARNING:  Warfarin can cause very serious (possibly fatal) bleeding. This is more likely to occur when you first start taking this medication or if you take too much warfarin. To decrease your risk for bleeding, your doctor or other health care provider will monitor you closely and check your lab results (INR test) to make sure  "you are not taking too much warfarin. Keep all medical and laboratory appointments. Tell your doctor right away if you notice any signs of serious bleeding. See also Side Effects section.      USES:  This medication is used to treat blood clots (such as in deep vein thrombosis-DVT or pulmonary embolus-PE) and/or to prevent new clots from forming in your body. Preventing harmful blood clots helps to reduce the risk of a stroke or heart attack. Conditions that increase your risk of developing blood clots include a certain type of irregular heart rhythm (atrial fibrillation), heart valve replacement, recent heart attack, and certain surgeries (such as hip/knee replacement). Warfarin is commonly called a \"blood thinner,\" but the more correct term is \"anticoagulant.\" It helps to keep blood flowing smoothly in your body by decreasing the amount of certain substances (clotting proteins) in your blood.      HOW TO USE:  Read the Medication Guide provided by your pharmacist before you start taking warfarin and each time you get a refill. If you have any questions, ask your doctor or pharmacist. Take this medication by mouth with or without food as directed by your doctor or other health care professional, usually once a day. It is very important to take it exactly as directed. Do not increase the dose, take it more frequently, or stop using it unless directed by your doctor. Dosage is based on your medical condition, laboratory tests (such as INR), and response to treatment. Your doctor or other health care provider will monitor you closely while you are taking this medication to determine the right dose for you. Use this medication regularly to get the most benefit from it. To help you remember, take it at the same time each day. It is important to eat a balanced, consistent diet while taking warfarin. Some foods can affect how warfarin works in your body and may affect your treatment and dose. Avoid sudden large increases " or decreases in your intake of foods high in vitamin K (such as broccoli, cauliflower, cabbage, brussels sprouts, kale, spinach, and other green leafy vegetables, liver, green tea, certain vitamin supplements). If you are trying to lose weight, check with your doctor before you try to go on a diet. Cranberry products may also affect how your warfarin works. Limit the amount of cranberry juice (16 ounces/480 milliliters a day) or other cranberry products you may drink or eat.      SIDE EFFECTS:  Nausea, loss of appetite, or stomach/abdominal pain may occur. If any of these effects persist or worsen, tell your doctor or pharmacist promptly. Remember that your doctor has prescribed this medication because he or she has judged that the benefit to you is greater than the risk of side effects. Many people using this medication do not have serious side effects. This medication can cause serious bleeding if it affects your blood clotting proteins too much (shown by unusually high INR lab results). Even if your doctor stops your medication, this risk of bleeding can continue for up to a week. Tell your doctor right away if you have any signs of serious bleeding, including: unusual pain/swelling/discomfort, unusual/easy bruising, prolonged bleeding from cuts or gums, persistent/frequent nosebleeds, unusually heavy/prolonged menstrual flow, pink/dark urine, coughing up blood, vomit that is bloody or looks like coffee grounds, severe headache, dizziness/fainting, unusual or persistent tiredness/weakness, bloody/black/tarry stools, chest pain, shortness of breath, difficulty swallowing. Tell your doctor right away if any of these unlikely but serious side effects occur: persistent nausea/vomiting, severe stomach/abdominal pain, yellowing eyes/skin. This drug rarely has caused very serious (possibly fatal) problems if its effects lead to small blood clots (usually at the beginning of treatment). This can lead to severe  skin/tissue damage that may require surgery or amputation if left untreated. Patients with certain blood conditions (protein C or S deficiency) may be at greater risk. Get medical help right away if any of these rare but serious side effects occur: painful/red/purplish patches on the skin (such as on the toe, breast, abdomen), change in the amount of urine, vision changes, confusion, slurred speech, weakness on one side of the body. A very serious allergic reaction to this drug is rare. However, get medical help right away if you notice any symptoms of a serious allergic reaction, including: rash, itching/swelling (especially of the face/tongue/throat), severe dizziness, trouble breathing. This is not a complete list of possible side effects. If you notice other effects not listed above, contact your doctor or pharmacist. In the [place] - Call your doctor for medical advice about side effects. You may report side effects to FDA at 4-348-JNN-0021. In [place] - Call your doctor for medical advice about side effects. You may report side effects to [facility] [place] at 1-830.555.1329.      PRECAUTIONS:  Before taking warfarin, tell your doctor or pharmacist if you are allergic to it; or if you have any other allergies. This product may contain inactive ingredients, which can cause allergic reactions or other problems. Talk to your pharmacist for more details. Before using this medication, tell your doctor or pharmacist your medical history, especially of: blood disorders (such as anemia, hemophilia), bleeding problems (such as bleeding of the stomach/intestines, bleeding in the brain), blood vessel disorders (such as aneurysms), recent major injury/surgery, liver disease, alcohol use, mental/mood disorders (including memory problems), frequent falls/injuries. It is important that all your doctors and dentists know that you take warfarin. Before having surgery or any medical/dental procedures, tell your doctor or dentist that you  are taking this medication and about all the products you use (including prescription drugs, nonprescription drugs, and herbal products). Avoid getting injections into the muscles. If you must have an injection into a muscle (for example, a flu shot), it should be given in the arm. This way, it will be easier to check for bleeding and/or apply pressure bandages. This medication may cause stomach bleeding. Daily use of alcohol while using this medicine will increase your risk for stomach bleeding and may also affect how this medication works. Limit or avoid alcoholic beverages. If you have not been eating well, if you have an illness or infection that causes fever, vomiting, or diarrhea for more than 2 days, or if you start using any antibiotic medications, contact your doctor or pharmacist immediately because these conditions can affect how warfarin works. This medication can cause heavy bleeding. To lower the chance of getting cut, bruised, or injured, use great caution with sharp objects like safety razors and nail cutters. Use an electric razor when shaving and a soft toothbrush when brushing your teeth. Avoid activities such as contact sports. If you fall or injure yourself, especially if you hit your head, call your doctor immediately. Your doctor may need to check you. The Food & Drug Administration has stated that generic warfarin products are interchangeable. However, consult your doctor or pharmacist before switching warfarin products. Be careful not to take more than one medication that contains warfarin unless specifically directed by the doctor or health care provider who is monitoring your warfarin treatment. Older adults may be at greater risk for bleeding while using this drug. This medication is not recommended for use during pregnancy because of serious (possibly fatal) harm to an unborn baby. Discuss the use of reliable forms of birth control with your doctor. If you become pregnant or think you  "may be pregnant, tell your doctor immediately. If you are planning pregnancy, discuss a plan for managing your condition with your doctor before you become pregnant. Your doctor may switch the type of medication you use during pregnancy. Very small amounts of this medication may pass into breast milk but is unlikely to harm a nursing infant. Consult your doctor before breast-feeding.      DRUG INTERACTIONS:  Drug interactions may change how your medications work or increase your risk for serious side effects. This document does not contain all possible drug interactions. Keep a list of all the products you use (including prescription/nonprescription drugs and herbal products) and share it with your doctor and pharmacist. Do not start, stop, or change the dosage of any medicines without your doctor's approval. Warfarin interacts with many prescription, nonprescription, vitamin, and herbal products. This includes medications that are applied to the skin or inside the vagina or rectum. The interactions with warfarin usually result in an increase or decrease in the \"blood-thinning\" (anticoagulant) effect. Your doctor or other health care professional should closely monitor you to prevent serious bleeding or clotting problems. While taking warfarin, it is very important to tell your doctor or pharmacist of any changes in medications, vitamins, or herbal products that you are taking. Some products that may interact with this drug include: capecitabine, imatinib, mifepristone. Aspirin, aspirin-like drugs (salicylates), and nonsteroidal anti-inflammatory drugs (NSAIDs such as ibuprofen, naproxen, celecoxib) may have effects similar to warfarin. These drugs may increase the risk of bleeding problems if taken during treatment with warfarin. Carefully check all prescription/nonprescription product labels (including drugs applied to the skin such as pain-relieving creams) since the products may contain NSAIDs or salicylates. " Talk to your doctor about using a different medication (such as acetaminophen) to treat pain/fever. Low-dose aspirin and related drugs (such as clopidogrel, ticlopidine) should be continued if prescribed by your doctor for specific medical reasons such as heart attack or stroke prevention. Consult your doctor or pharmacist for more details. Many herbal products interact with warfarin. Tell your doctor before taking any herbal products, especially bromelains, coenzyme Q10, cranberry, danshen, dong quai, fenugreek, garlic, ginkgo biloba, ginseng, and Zeferino's wort, among others. This medication may interfere with a certain laboratory test to measure theophylline levels, possibly causing false test results. Make sure laboratory personnel and all your doctors know you use this drug.      OVERDOSE:  If overdose is suspected, contact a poison control center or emergency room immediately. [place] residents can call the [employer] Poison Hotline at 1-971.298.6594. [place] residents can call a provincial poison control center. Symptoms of overdose may include: bloody/black/tarry stools, pink/dark urine, unusual/prolonged bleeding.      NOTES:  Do not share this medication with others. Laboratory and/or medical tests (such as INR, complete blood count) must be performed periodically to monitor your progress or check for side effects. Consult your doctor for more details.      MISSED DOSE:  For the best possible benefit, do not miss any doses. If you do miss a dose and remember on the same day, take it as soon as you remember. If you remember on the next day, skip the missed dose and resume your usual dosing schedule. Do not double the dose to catch up because this could increase your risk for bleeding. Keep a record of missed doses to give to your doctor or pharmacist. Contact your doctor or pharmacist if you miss 2 or more doses in a row.      STORAGE:  Store at room temperature away from light and moisture. Do not store in the  bathroom. Keep all medications away from children and pets. Do not flush medications down the toilet or pour them into a drain unless instructed to do so. Properly discard this product when it is  or no longer needed. Consult your pharmacist or local waste disposal company for more details about how to safely discard your product.      MEDICAL ALERT:  Your condition and medication can cause complications in a medical emergency. For information about enrolling in MedicAlert, call 1-300.349.5669 ([place]) or 1-387.816.6559 ([place]).      Information last revised 2010 Copyright(c)  [employer]             Depression / Suicide Risk    As you are discharged from this [facility], it is important to learn how to keep safe from harming yourself.    Recognize the warning signs:  · Abrupt changes in personality, positive or negative- including increase in energy   · Giving away possessions  · Change in eating patterns- significant weight changes-  positive or negative  · Change in sleeping patterns- unable to sleep or sleeping all the time   · Unwillingness or inability to communicate  · Depression  · Unusual sadness, discouragement and loneliness  · Talk of wanting to die  · Neglect of personal appearance   · Rebelliousness- reckless behavior  · Withdrawal from people/activities they love  · Confusion- inability to concentrate     If you or a loved one observes any of these behaviors or has concerns about self-harm, here's what you can do:  · Talk about it- your feelings and reasons for harming yourself  · Remove any means that you might use to hurt yourself (examples: pills, rope, extension cords, firearm)  · Get professional help from the community (Mental Health, Substance Abuse, psychological counseling)  · Do not be alone:Call your Safe Contact- someone whom you trust who will be there for you.  · Call your local CRISIS HOTLINE 342-4544 or 496-580-2248  · Call your local Children's  Mobile Crisis Response Team [place] (240) 208-7061 or www.[employer]  · Call the toll free National Suicide Prevention Hotlines   · National Suicide Prevention Lifeline 187-912-JGAN (9966)  · National Hope Line Network 800-SUICIDE (195-2804)

## 2022-02-03 NOTE — DOCUMENTATION QUERY
[facility]                                                                       Query Response Note      PATIENT:               EDMOND DEJESUS  ACCT #:                  [id number]  MRN:                     [id number]  :                      1950  ADMIT DATE:       2022 6:42 PM  DISCH DATE:        2022 4:43 PM  RESPONDING  PROVIDER #:        [id number]           QUERY TEXT:    Chronic Kidney Disease (CKD) is documented in the Medical Record.  Please specify the disease stage (includes probable or suspected)    Stages are defined by the National Kid[employer] Foundation as follows:  CKD Stage I  GFR >= 90 ml / min per 1.73 m2 and persistent albuminuria  CKD Stage 2 GFR between 60 and 89 with persistent albuminuria  CKD Stage 3a GFR between 45 - 59  CKD Stage 3b GFR between 30-44  CKD Stage 4 GFR between 15 and 29   CKD Stage 5 GFR between <15 or End Stage Renal Disease    The patient's Clinical Indicators include:  Per Progress Note: Acute kidney injury superimposed on chronic kidney disease.  Creatinine baseline 1.01-1.18.     GFR 51,  GFR > 60,   GFR 38  Risk Factors: age, contrast exposure, tobacco use   Treatment: monitor labs     Thank you,  Debra Schmidt RN, BSN  Clinical    Connect via [employer]  Options provided:   -- Chronic kidney disease Stage 1   -- Chronic kidney disease Stage 2   -- Chronic kidney disease Stage 3 unspecified   -- Chronic kidney disease Stage 3a   -- Chronic kidney disease Stage 3b   -- Chronic kidney disease Stage 4   -- CKD ruled out   -- Unable to determine      Query created by: Debra Schmidt on 2022 8:28 AM    RESPONSE TEXT:    Chronic kidney disease Stage 3 unspecified          Electronically signed by:  NICOLE LEOANRDO MD 2022 6:22 PM

## 2022-02-08 NOTE — DOCUMENTATION QUERY
"                                                                         [facility]                                                                       Query Response Note      PATIENT:               EDMOND DEJESUS  ACCT #:                  [id number]  MRN:                     [id number]  :                      1950  ADMIT DATE:       2022 6:42 PM  DISCH DATE:        2022 4:43 PM  RESPONDING  PROVIDER #:        [id number]           QUERY TEXT:    There is unclear documentation related to the POA status of Acute pulmonary embolism:    1) Acute pulmonary embolism is documented beginning with the  Progress Notes as POA  2) Per the  Progress Note \"New findings from CTA chest\".  Pt was admitted on .    Can the POA status of Acute pulmonary embolism be clarified?     NOTE:  If an appropriate response is not listed below, please respond with a new note.        The patient's Clinical Indicators include:  Per  Progress Note: Acute pulmonary embolism POA, new findings from CTA chest  Per DC Summary: Post surgery pt continued to be hypotensive, slightly tachycardic.  Did not respond to IVF. D-dimers were done and significantly elevated.  Subsequently CT chest was performed and showed bilateral pulmonary emboli left more than right  Per H&P: no respiratory distress .  Acute pulmonary embolism: POA: yes    CT Chest: Acute bilateral pulmonary emboli, left more than right   D Dimer: 7.48  Admit () SpO2 96% 2L , RR 20   Risk Factors: post op status, femoral neck fracture,  history of smoking,   Treatment: lovenox, coumbadin bridge     Thank you,  Debra Schmidt RN, BSN  Clinical   Connect [employer]  Options provided:   -- Acute pulmonary embolism was present at the time of inpatient admission   -- Acute pulmonary embolism was not present at the time of inpatient admission   -- Unable to determine      Query created by: Debra Schmidt on 2022 10:03 " AM    RESPONSE TEXT:    Acute pulmonary embolism was present at the time of inpatient admission          Electronically signed by:  NICOLE LEONARDO MD 2/8/2022 3:02 PM

## (undated) DEVICE — TOWELS CLOTH SURGICAL - (4/PK 20PK/CA)

## (undated) DEVICE — GLOVE BIOGEL PI ORTHO SZ 8 PF LF (40PR/BX)

## (undated) DEVICE — GLOVE SZ 7.5 BIOGEL PI MICRO - PF LF (50PR/BX)

## (undated) DEVICE — SLEEVE, VASO, THIGH, MED

## (undated) DEVICE — CONNECTOR 5-IN-1 STERILE - (25EA/BX)

## (undated) DEVICE — PROTECTOR ULNA NERVE - (36PR/CA)

## (undated) DEVICE — SUTURE 2-0 VICRYL PLUS CT-1 - 8 X 18 INCH(12/BX)

## (undated) DEVICE — SUTURE 1 VICRYL PLUS CTX - 8 X 18 INCH (12/BX)

## (undated) DEVICE — DRAPE STRLE REG TOWEL 18X24 - (10/BX 4BX/CA)"

## (undated) DEVICE — ELECTRODE DUAL RETURN W/ CORD - (50/PK)

## (undated) DEVICE — TOWEL STOP TIMEOUT SAFETY FLAG (40EA/CA)

## (undated) DEVICE — LACTATED RINGERS INJ 1000 ML - (14EA/CA 60CA/PF)

## (undated) DEVICE — GLOVE BIOGEL PI ORTHO SZ 6 1/2 SURGICAL PF LF (40PR/BX)

## (undated) DEVICE — GLOVE BIOGEL INDICATOR SZ 8 SURGICAL PF LTX - (50/BX 4BX/CA)

## (undated) DEVICE — KIT ANESTHESIA W/CIRCUIT & 3/LT BAG W/FILTER (20EA/CA)

## (undated) DEVICE — BLADE SAGITTAL SAW DUAL CUT 75.0 X 25.0MM (1/EA)

## (undated) DEVICE — GLOVE BIOGEL SZ 6.5 SURGICAL PF LTX (50PR/BX 4BX/CA)

## (undated) DEVICE — GOWN WARMING STANDARD FLEX - (30/CA)

## (undated) DEVICE — GLOVE SZ 8 BIOGEL PI MICRO - PF LF (50PR/BX)

## (undated) DEVICE — SET LEADWIRE 5 LEAD BEDSIDE DISPOSABLE ECG (1SET OF 5/EA)

## (undated) DEVICE — ELECTRODE 850 FOAM ADHESIVE - HYDROGEL RADIOTRNSPRNT (50/PK)

## (undated) DEVICE — NEPTUNE 4 PORT MANIFOLD - (20/PK)

## (undated) DEVICE — GLOVE BIOGEL PI INDICATOR SZ 8.0 SURGICAL PF LF -(50/BX 4BX/CA)

## (undated) DEVICE — DRESSING AQUACEL AG ADVANTAGE 3.5 X 10" (10EA/BX)"

## (undated) DEVICE — SUCTION INSTRUMENT YANKAUER BULBOUS TIP W/O VENT (50EA/CA)

## (undated) DEVICE — HEAD HOLDER JUNIOR/ADULT

## (undated) DEVICE — GLOVE BIOGEL INDICATOR SZ 7SURGICAL PF LTX - (50/BX 4BX/CA)

## (undated) DEVICE — GOWN SURGEONS X-LARGE - DISP. (30/CA)

## (undated) DEVICE — CANISTER SUCTION 3000ML MECHANICAL FILTER AUTO SHUTOFF MEDI-VAC NONSTERILE LF DISP  (40EA/CA)

## (undated) DEVICE — STAPLER SKIN DISP - (6/BX 10BX/CA) VISISTAT

## (undated) DEVICE — PACK TOTAL HIP - (1/CA)

## (undated) DEVICE — TUBING CLEARLINK DUO-VENT - C-FLO (48EA/CA)

## (undated) DEVICE — MASK ANESTHESIA ADULT  - (100/CA)

## (undated) DEVICE — SUTURE 5 TI-CRON HOS-14 - (36/BX)

## (undated) DEVICE — SENSOR SPO2 NEO LNCS ADHESIVE (20/BX) SEE USER NOTES

## (undated) DEVICE — SET EXTENSION WITH 2 PORTS (48EA/CA) ***PART #2C8610 IS A SUBSTITUTE*****

## (undated) DEVICE — SODIUM CHL IRRIGATION 0.9% 1000ML (12EA/CA)

## (undated) DEVICE — DRESSING POST OP BORDER 4 X 10 (5EA/BX)

## (undated) DEVICE — GLOVE BIOGEL SZ 7.5 SURGICAL PF LTX - (50PR/BX 4BX/CA)

## (undated) DEVICE — SUTURE GENERAL

## (undated) DEVICE — CHLORAPREP 26 ML APPLICATOR - ORANGE TINT(25/CA)